# Patient Record
Sex: FEMALE | Race: BLACK OR AFRICAN AMERICAN | Employment: OTHER | URBAN - METROPOLITAN AREA
[De-identification: names, ages, dates, MRNs, and addresses within clinical notes are randomized per-mention and may not be internally consistent; named-entity substitution may affect disease eponyms.]

---

## 2017-01-18 ENCOUNTER — OFFICE VISIT (OUTPATIENT)
Dept: FAMILY MEDICINE CLINIC | Facility: CLINIC | Age: 47
End: 2017-01-18

## 2017-01-18 VITALS
TEMPERATURE: 97.8 F | OXYGEN SATURATION: 100 % | HEART RATE: 59 BPM | HEIGHT: 63 IN | RESPIRATION RATE: 14 BRPM | WEIGHT: 184 LBS | SYSTOLIC BLOOD PRESSURE: 114 MMHG | DIASTOLIC BLOOD PRESSURE: 86 MMHG | BODY MASS INDEX: 32.6 KG/M2

## 2017-01-18 DIAGNOSIS — Z12.4 ROUTINE CERVICAL SMEAR: Primary | ICD-10-CM

## 2017-01-18 DIAGNOSIS — Z12.4 SCREENING FOR MALIGNANT NEOPLASM OF CERVIX: ICD-10-CM

## 2017-01-18 DIAGNOSIS — Z01.419 WELL WOMAN EXAM WITH ROUTINE GYNECOLOGICAL EXAM: Primary | ICD-10-CM

## 2017-01-18 NOTE — MR AVS SNAPSHOT
Visit Information Date & Time Provider Department Dept. Phone Encounter #  
 1/18/2017  1:45 PM Jacob Rivera MD [facility] [phone] [id number] Follow-up Instructions Return in 3 years (on 1/18/2020), or if Pap is normal. Upcoming Health Maintenance Date Due  
 PAP AKA CERVICAL CYTOLOGY 4/27/1989 DTaP/Tdap/Td series (2 - Td) 9/19/2026 Allergies as of 1/18/2017  Review Complete On: 1/18/2017 By: Jacob Rivera MD  
 No Known Allergies Current Immunizations  Reviewed on 9/19/2016 Name Date Influenza Vaccine (Quad) PF 9/19/2016 11:30 AM  
 Tdap 9/19/2016 11:30 AM  
  
 Not reviewed this visit You Were Diagnosed With   
  
 Codes Comments Well woman exam with routine gynecological exam     ICD-10-CM: O83.249 ICD-9-CM: V72.31 [V72.31] Screening for malignant neoplasm of cervix     ICD-10-CM: Z12.4 ICD-9-CM: V76.2 Vitals BP Pulse Temp Resp Height(growth percentile) Weight(growth percentile) 114/86 (BP 1 Location: Right arm, BP Patient Position: Sitting) (!) 59 97.8 °F (36.6 °C) (Oral) 14 5' 3\" (1.6 m) 184 lb (83.5 kg) LMP SpO2 BMI OB Status Smoking Status 11/10/2016 100% 32.59 kg/m2 Having regular periods Never Smoker BMI and BSA Data Body Mass Index Body Surface Area 32.59 kg/m 2 1.93 m 2 Your Updated Medication List  
  
   
This list is accurate as of: 1/18/17  3:08 PM.  Always use your most recent med list.  
  
  
  
  
 aspirin delayed-release 81 mg tablet Take  by mouth daily. naproxen 500 mg tablet Commonly known as:  NAPROSYN Take 1 Tab by mouth two (2) times daily (with meals). Omeprazole delayed release 20 mg tablet Commonly known as:  PRILOSEC D/R Take 1 Tab by mouth daily. Indications: GASTROESOPHAGEAL REFLUX We Performed the Following PAP, LIQUID BASED, IMAGE PROCESSED [id number] CPT(R)] Follow-up Instructions Return in 3 years (on 1/18/2020), or if Pap is normal.  
  
  
Introducing [facility]! Dear Aayush Krause: Thank you for requesting a GigaCrete account. Our records indicate that you already have an active GigaCrete account. You can access your account anytime at [email or web address] Did you know that you can access your hospital and ER discharge instructions at any time in GigaCrete? You can also review all of your test results from your hospital stay or ER visit. Additional Information If you have questions, please visit the Frequently Asked Questions section of the GigaCrete website at [email or web address] Remember, GigaCrete is NOT to be used for urgent needs. For medical emergencies, dial 911. Now available from your iPhone and Android! Please provide this summary of care documentation to your next provider. Your primary care clinician is listed as Shu Dodge. If you have any questions after today's visit, please call [phone].

## 2017-01-18 NOTE — PROGRESS NOTES
Subjective:   48 y.o. female for Well Woman Check. Patient's last menstrual period was 11/10/2016. Social History: not sexually active. Pertinent past medical hstory: no history of HTN, DVT, CAD, DM, liver disease, migraines or smoking. Patient Active Problem List    Diagnosis Date Noted    Gastroesophageal reflux disease without esophagitis 2016    Sacroiliac joint dysfunction of both sides 2016    TIA (transient ischemic attack) 2014     Past Medical History   Diagnosis Date    Cardiac nuclear imaging test 2014     Low risk. No ischemia or prior infarction. EF 63%. No RWMA. Normal EKG on pharm stress test.    Cardiac transesophageal echocardiography (TASHA) 2014     EF 55%. Very sm R-L shunt suggestive of PFO. No LA appendage thrombus.  Cardiovascular LLE venous duplex 2015     Left leg:  No DVT. Past Surgical History   Procedure Laterality Date    Hx  section  ,     Social History   Substance Use Topics    Smoking status: Never Smoker    Smokeless tobacco: Not on file    Alcohol use No        ROS:  Feeling well. No dyspnea or chest pain on exertion. No abdominal pain, change in bowel habits, black or bloody stools. No urinary tract symptoms. GYN ROS: normal menses, no abnormal bleeding, pelvic pain or discharge, no breast pain or new or enlarging lumps on self exam, she complains of a skin lesion on her posterior neck for the past 3 days - noted incidentally. She thinks her period is late due to stress. No neurological complaints. Objective:     Visit Vitals    /86 (BP 1 Location: Right arm, BP Patient Position: Sitting)    Pulse (!) 59    Temp 97.8 °F (36.6 °C) (Oral)    Resp 14    Ht 5' 3\" (1.6 m)    Wt 184 lb (83.5 kg)    LMP 11/10/2016    SpO2 100%    BMI 32.59 kg/m2     The patient appears well, alert, oriented x 3, in no distress. ENT normal.  Neck supple. No adenopathy or thyromegaly. HU.  Lungs are clear, good air entry, no wheezes, rhonchi or rales. S1 and S2 normal, no murmurs, regular rate and rhythm. Abdomen soft without tenderness, guarding, mass or organomegaly. Extremities show no edema, normal peripheral pulses. Neurological is normal, no focal findings. Skin: 2.5 X 3cm hyperpigmented lesion on midline upper back/neck (similar to other similar lesions on other parts of her skin). BREAST EXAM: breasts appear normal, no suspicious masses, no skin or nipple changes or axillary nodes    PELVIC EXAM: normal external genitalia, vulva, vagina, cervix, uterus and adnexa, PAP: Pap smear done today, saw liquid based cytology, exam chaperoned by Darlene Spencer LPN    Assessment/Plan:   well woman  Mammogram - already ordered  pap smear  Reassurance about the skin lesion.

## 2017-01-20 LAB
CYTOLOGIST CVX/VAG CYTO: NORMAL
CYTOLOGY CVX/VAG DOC THIN PREP: NORMAL
DX ICD CODE: NORMAL
Lab: NORMAL
OTHER STN SPEC: NORMAL
PATH REPORT.FINAL DX SPEC: NORMAL
STAT OF ADQ CVX/VAG CYTO-IMP: NORMAL

## 2017-01-25 NOTE — PROGRESS NOTES
Patient made aware of normal pap results. Verified name and . Patient verbalized an understanding of results and did not voice any concerns at this time.

## 2017-04-21 ENCOUNTER — OFFICE VISIT (OUTPATIENT)
Dept: FAMILY MEDICINE CLINIC | Facility: CLINIC | Age: 47
End: 2017-04-21

## 2017-04-21 VITALS
DIASTOLIC BLOOD PRESSURE: 62 MMHG | OXYGEN SATURATION: 97 % | BODY MASS INDEX: 31.04 KG/M2 | WEIGHT: 175.2 LBS | RESPIRATION RATE: 12 BRPM | HEIGHT: 63 IN | TEMPERATURE: 98.6 F | SYSTOLIC BLOOD PRESSURE: 113 MMHG | HEART RATE: 68 BPM

## 2017-04-21 DIAGNOSIS — H65.01 RIGHT ACUTE SEROUS OTITIS MEDIA, RECURRENCE NOT SPECIFIED: Primary | ICD-10-CM

## 2017-04-21 RX ORDER — FLUTICASONE PROPIONATE 50 MCG
2 SPRAY, SUSPENSION (ML) NASAL DAILY
Qty: 1 BOTTLE | Refills: 11 | Status: SHIPPED | OUTPATIENT
Start: 2017-04-21 | End: 2018-03-02

## 2017-04-21 NOTE — PROGRESS NOTES
HISTORY OF PRESENT ILLNESS  Neri Casillas is a 48 y.o. female. HPI Comments: Presents with one week history of right ear pain, without discharge, tinnitus or hearing loss. No trauma. Not associated with chewing or jaw excursion. No nasal congestion or sore throat, no dental pain. Naprosyn and aspirin don't really help. Ear Pain   The history is provided by the patient. This is a new problem. Episode onset: about a week ago. The problem occurs constantly. The problem has not changed since onset. Associated symptoms include headaches. Pertinent negatives include no chest pain and no shortness of breath. Nothing aggravates the symptoms. Treatments tried: naprosyn. The treatment provided mild relief. Headache   Associated symptoms include headaches. Pertinent negatives include no chest pain and no shortness of breath. Past Medical History:   Diagnosis Date    Cardiac nuclear imaging test 2014    Low risk. No ischemia or prior infarction. EF 63%. No RWMA. Normal EKG on pharm stress test.    Cardiac transesophageal echocardiography (TASHA) 2014    EF 55%. Very sm R-L shunt suggestive of PFO. No LA appendage thrombus.  Cardiovascular LLE venous duplex 2015    Left leg:  No DVT. Past Surgical History:   Procedure Laterality Date    HX  SECTION  1453,1153       History   Smoking Status    Never Smoker   Smokeless Tobacco    Never Used     Current Outpatient Prescriptions   Medication Sig    aspirin delayed-release 81 mg tablet Take  by mouth daily.  Omeprazole delayed release (PRILOSEC D/R) 20 mg tablet Take 1 Tab by mouth daily. Indications: GASTROESOPHAGEAL REFLUX    naproxen (NAPROSYN) 500 mg tablet Take 1 Tab by mouth two (2) times daily (with meals). No current facility-administered medications for this visit. Review of Systems   Constitutional: Negative for chills and fever. HENT: Positive for ear pain (right).  Negative for congestion, sore throat and tinnitus. Respiratory: Negative for shortness of breath. Cardiovascular: Negative for chest pain. Gastrointestinal: Negative for nausea and vomiting. Neurological: Positive for headaches. Negative for dizziness. Visit Vitals    /62 (BP 1 Location: Right arm, BP Patient Position: Sitting)    Pulse 68    Temp 98.6 °F (37 °C) (Oral)    Resp 12    Ht 5' 3\" (1.6 m)    Wt 175 lb 3.2 oz (79.5 kg)    LMP 03/23/2017 (Approximate)    SpO2 97%    BMI 31.04 kg/m2       Physical Exam   Constitutional: She is oriented to person, place, and time. She appears well-developed and well-nourished. No distress. HENT:   Right Ear: Tympanic membrane is not erythematous. A middle ear effusion is present. Left Ear: Tympanic membrane, external ear and ear canal normal.   Nose: Mucosal edema present. Mouth/Throat: Oropharynx is clear and moist.   No TMJ tenderness or crepitus   Neck: Neck supple. No thyromegaly present. Cardiovascular: Normal rate and regular rhythm. Exam reveals no gallop and no friction rub. No murmur heard. Pulmonary/Chest: Effort normal and breath sounds normal. No respiratory distress. Lymphadenopathy:     She has no cervical adenopathy. Neurological: She is alert and oriented to person, place, and time. Skin: Skin is warm and dry. Psychiatric: She has a normal mood and affect. Her behavior is normal. Judgment and thought content normal.       ASSESSMENT and PLAN    ICD-10-CM ICD-9-CM    1. Right acute serous otitis media, recurrence not specified H65.01 381.01 fluticasone (FLONASE) 50 mcg/actuation nasal spray     Follow-up Disposition:  Return if symptoms worsen or fail to improve. the following changes in treatment are made: Add Flonase for a few weeks - may use Afrin for 3 days only for quick relief  reviewed medications and side effects in detail  Plan of care reviewed - patient verbalize(s) understanding and agreement.

## 2017-04-21 NOTE — PATIENT INSTRUCTIONS
Middle Ear Fluid: Care Instructions  Your Care Instructions    Fluid often builds up inside the ear during a cold or allergies. Usually the fluid drains away, but sometimes a small tube in the ear, called the eustachian tube, stays blocked for months. Symptoms of fluid buildup may include:  · Popping, ringing, or a feeling of fullness or pressure in the ear. · Trouble hearing. · Balance problems and dizziness. In most cases, you can treat yourself at home. Follow-up care is a key part of your treatment and safety. Be sure to make and go to all appointments, and call your doctor if you are having problems. It's also a good idea to know your test results and keep a list of the medicines you take. How can you care for yourself at home? · In most cases, the fluid clears up within a few months without treatment. You may need more tests if the fluid does not clear up after 3 months. · If your doctor prescribed antibiotics, take them as directed. Do not stop taking them just because you feel better. You need to take the full course of antibiotics. When should you call for help? Watch closely for changes in your health, and be sure to contact your doctor if:  · You have pain or a fever. · You have any new symptoms, such as hearing problems. · You do not get better as expected. Where can you learn more? Go to http://wil-ahsan.info/. Enter W164 in the search box to learn more about \"Middle Ear Fluid: Care Instructions. \"  Current as of: July 29, 2016  Content Version: 11.2  © 4837-9229 [employer]. Care instructions adapted under license by [employer] (which disclaims liability or warranty for this information). If you have questions about a medical condition or this instruction, always ask your healthcare professional. [address] any warranty or liability for your use of this information.

## 2017-04-21 NOTE — PROGRESS NOTES
1. Have you been to the ER, urgent care clinic since your last visit? Hospitalized since your last visit? No    2. Have you seen or consulted any other health care providers outside of the [address] [place] [address] Drive since your last visit? Include any pap smears or colon screening.  No

## 2017-04-21 NOTE — MR AVS SNAPSHOT
Visit Information Date & Time Provider Department Dept. Phone Encounter #  
 4/21/2017 12:15 PM Asetr Go MD [facility] [phone] [id number] Follow-up Instructions Return if symptoms worsen or fail to improve. Upcoming Health Maintenance Date Due  
 PAP AKA CERVICAL CYTOLOGY 1/18/2020 DTaP/Tdap/Td series (2 - Td) 9/19/2026 Allergies as of 4/21/2017  Review Complete On: 4/21/2017 By: Aster Go MD  
 No Known Allergies Current Immunizations  Reviewed on 9/19/2016 Name Date Influenza Vaccine (Quad) PF 9/19/2016 11:30 AM  
 Tdap 9/19/2016 11:30 AM  
  
 Not reviewed this visit You Were Diagnosed With   
  
 Codes Comments Right acute serous otitis media, recurrence not specified    -  Primary ICD-10-CM: H65.01 
ICD-9-CM: 381.01 Vitals BP Pulse Temp Resp Height(growth percentile) Weight(growth percentile) 113/62 (BP 1 Location: Right arm, BP Patient Position: Sitting) 68 98.6 °F (37 °C) (Oral) 12 5' 3\" (1.6 m) 175 lb 3.2 oz (79.5 kg) LMP SpO2 BMI OB Status Smoking Status 03/23/2017 (Approximate) 97% 31.04 kg/m2 Having regular periods Never Smoker BMI and BSA Data Body Mass Index Body Surface Area 31.04 kg/m 2 1.88 m 2 Preferred Pharmacy Pharmacy Name Phone [facility] [address] [phone] Your Updated Medication List  
  
   
This list is accurate as of: 4/21/17 12:33 PM.  Always use your most recent med list.  
  
  
  
  
 aspirin delayed-release 81 mg tablet Take  by mouth daily. fluticasone 50 mcg/actuation nasal spray Commonly known as:  [place] 2 Sprays by Both Nostrils route daily. naproxen 500 mg tablet Commonly known as:  NAPROSYN Take 1 Tab by mouth two (2) times daily (with meals). Omeprazole delayed release 20 mg tablet Commonly known as:  PRILOSEC D/R  
 Take 1 Tab by mouth daily. Indications: GASTROESOPHAGEAL REFLUX Prescriptions Sent to Pharmacy Refills  
 fluticasone (FLONASE) 50 mcg/actuation nasal spray 11 Si Sprays by Both Nostrils route daily. Class: Normal  
 Pharmacy: [facility] [address] Ph #: [phone] Route: Both Nostrils Follow-up Instructions Return if symptoms worsen or fail to improve. Patient Instructions Middle Ear Fluid: Care Instructions Your Care Instructions Fluid often builds up inside the ear during a cold or allergies. Usually the fluid drains away, but sometimes a small tube in the ear, called the eustachian tube, stays blocked for months. Symptoms of fluid buildup may include: · Popping, ringing, or a feeling of fullness or pressure in the ear. · Trouble hearing. · Balance problems and dizziness. In most cases, you can treat yourself at home. Follow-up care is a key part of your treatment and safety. Be sure to make and go to all appointments, and call your doctor if you are having problems. It's also a good idea to know your test results and keep a list of the medicines you take. How can you care for yourself at home? · In most cases, the fluid clears up within a few months without treatment. You may need more tests if the fluid does not clear up after 3 months. · If your doctor prescribed antibiotics, take them as directed. Do not stop taking them just because you feel better. You need to take the full course of antibiotics. When should you call for help? Watch closely for changes in your health, and be sure to contact your doctor if: 
· You have pain or a fever. · You have any new symptoms, such as hearing problems. · You do not get better as expected. Where can you learn more? Go to http://wil-ahsan.info/.  
Enter M827 in the search box to learn more about \"Middle Ear Fluid: Care Instructions. \" Current as of: July 29, 2016 Content Version: 11.2 © 9021-4144 [employer]. Care instructions adapted under license by HealthSource (which disclaims liability or warranty for this information). If you have questions about a medical condition or this instruction, always ask your healthcare professional. [address] any warranty or liability for your use of this information. Introducing [facility]! Dear Zack Hook: Thank you for requesting a OneBuild account. Our records indicate that you already have an active OneBuild account. You can access your account anytime at [email or web address] Did you know that you can access your hospital and ER discharge instructions at any time in OneBuild? You can also review all of your test results from your hospital stay or ER visit. Additional Information If you have questions, please visit the Frequently Asked Questions section of the OneBuild website at [email or web address] Remember, OneBuild is NOT to be used for urgent needs. For medical emergencies, dial 911. Now available from your iPhone and Android! Please provide this summary of care documentation to your next provider. Your primary care clinician is listed as Wiley Behzad. If you have any questions after today's visit, please call [phone].

## 2018-03-02 ENCOUNTER — OFFICE VISIT (OUTPATIENT)
Dept: FAMILY MEDICINE CLINIC | Facility: CLINIC | Age: 48
End: 2018-03-02

## 2018-03-02 VITALS
OXYGEN SATURATION: 100 % | HEART RATE: 66 BPM | TEMPERATURE: 97.7 F | RESPIRATION RATE: 18 BRPM | BODY MASS INDEX: 32.71 KG/M2 | SYSTOLIC BLOOD PRESSURE: 131 MMHG | DIASTOLIC BLOOD PRESSURE: 73 MMHG | WEIGHT: 184.6 LBS | HEIGHT: 63 IN

## 2018-03-02 DIAGNOSIS — E66.09 CLASS 1 OBESITY DUE TO EXCESS CALORIES WITHOUT SERIOUS COMORBIDITY WITH BODY MASS INDEX (BMI) OF 32.0 TO 32.9 IN ADULT: ICD-10-CM

## 2018-03-02 DIAGNOSIS — K21.9 GASTROESOPHAGEAL REFLUX DISEASE WITHOUT ESOPHAGITIS: ICD-10-CM

## 2018-03-02 DIAGNOSIS — R07.9 CHEST PAIN, UNSPECIFIED TYPE: Primary | ICD-10-CM

## 2018-03-02 RX ORDER — PHENOL/SODIUM PHENOLATE
20 AEROSOL, SPRAY (ML) MUCOUS MEMBRANE DAILY
Qty: 30 TAB | Refills: 11 | Status: SHIPPED | OUTPATIENT
Start: 2018-03-02 | End: 2018-06-19

## 2018-03-02 NOTE — ACP (ADVANCE CARE PLANNING)
The patient was advised and counseled regarding advanced directives. The patient was provided with an information packet.

## 2018-03-02 NOTE — PATIENT INSTRUCTIONS
Body Mass Index: Care Instructions  Your Care Instructions    Body mass index (BMI) can help you see if your weight is raising your risk for health problems. It uses a formula to compare how much you weigh with how tall you are. · A BMI lower than 18.5 is considered underweight. · A BMI between 18.5 and 24.9 is considered healthy. · A BMI between 25 and 29.9 is considered overweight. A BMI of 30 or higher is considered obese. If your BMI is in the normal range, it means that you have a lower risk for weight-related health problems. If your BMI is in the overweight or obese range, you may be at increased risk for weight-related health problems, such as high blood pressure, heart disease, stroke, arthritis or joint pain, and diabetes. If your BMI is in the underweight range, you may be at increased risk for health problems such as fatigue, lower protection (immunity) against illness, muscle loss, bone loss, hair loss, and hormone problems. BMI is just one measure of your risk for weight-related health problems. You may be at higher risk for health problems if you are not active, you eat an unhealthy diet, or you drink too much alcohol or use tobacco products. Follow-up care is a key part of your treatment and safety. Be sure to make and go to all appointments, and call your doctor if you are having problems. It's also a good idea to know your test results and keep a list of the medicines you take. How can you care for yourself at home? · Practice healthy eating habits. This includes eating plenty of fruits, vegetables, whole grains, lean protein, and low-fat dairy. · If your doctor recommends it, get more exercise. Walking is a good choice. Bit by bit, increase the amount you walk every day. Try for at least 30 minutes on most days of the week. · Do not smoke. Smoking can increase your risk for health problems. If you need help quitting, talk to your doctor about stop-smoking programs and medicines. These can increase your chances of quitting for good. · Limit alcohol to 2 drinks a day for men and 1 drink a day for women. Too much alcohol can cause health problems. If you have a BMI higher than 25  · Your doctor may do other tests to check your risk for weight-related health problems. This may include measuring the distance around your waist. A waist measurement of more than 40 inches in men or 35 inches in women can increase the risk of weight-related health problems. · Talk with your doctor about steps you can take to stay healthy or improve your health. You may need to make lifestyle changes to lose weight and stay healthy, such as changing your diet and getting regular exercise. If you have a BMI lower than 18.5  · Your doctor may do other tests to check your risk for health problems. · Talk with your doctor about steps you can take to stay healthy or improve your health. You may need to make lifestyle changes to gain or maintain weight and stay healthy, such as getting more healthy foods in your diet and doing exercises to build muscle. Where can you learn more? Go to http://wil-ahsan.info/. Enter S176 in the search box to learn more about \"Body Mass Index: Care Instructions. \"  Current as of: October 13, 2016  Content Version: 11.4  © 8034-7444 Healthwise[employer]orporated. Care instructions adapted under license by SPO (which disclaims liability or warranty for this information). If you have questions about a medical condition or this instruction, always ask your healthcare professional. [address] any warranty or liability for your use of this information.

## 2018-03-02 NOTE — PROGRESS NOTES
HISTORY OF PRESENT ILLNESS  Williams Bishop is a 50 y.o. female. HPI Comments: Presents with several episodes of transient chest pain, radiating to her mid-back and into both sides of her neck, over the past week. Associated with SOB. These occur when upright, and are not related to eating or lying down. They resolve spontaneously. She has a history of GERD, but hasn't been taking anything for this for at least 2 months. No palpitations, nausea or diaphoresis. She has been evaluated in the past for chest pain, but those pains were longer-lasting, and not as severe. Chest Pain    Associated symptoms include back pain and shortness of breath. Pertinent negatives include no diaphoresis, no fever, no nausea, no palpitations and no vomiting. Back Pain    Associated symptoms include chest pain. Pertinent negatives include no fever. Ear Pain   Associated symptoms include chest pain and shortness of breath. Past Medical History:   Diagnosis Date    Cardiac nuclear imaging test 2014    Low risk. No ischemia or prior infarction. EF 63%. No RWMA. Normal EKG on pharm stress test.    Cardiac transesophageal echocardiography (TASHA) 2014    EF 55%. Very sm R-L shunt suggestive of PFO. No LA appendage thrombus.  Cardiovascular LLE venous duplex 2015    Left leg:  No DVT. Past Surgical History:   Procedure Laterality Date    HX  SECTION  1273,1609       History   Smoking Status    Never Smoker   Smokeless Tobacco    Never Used     Current Outpatient Prescriptions   Medication Sig    aspirin delayed-release 81 mg tablet Take  by mouth daily.  Omeprazole delayed release (PRILOSEC D/R) 20 mg tablet Take 1 Tab by mouth daily. Indications: GASTROESOPHAGEAL REFLUX     No current facility-administered medications for this visit. Review of Systems   Constitutional: Negative for chills, diaphoresis and fever. HENT: Positive for sore throat.     Respiratory: Positive for shortness of breath. Cardiovascular: Positive for chest pain. Negative for palpitations. Gastrointestinal: Negative for nausea and vomiting. Musculoskeletal: Positive for back pain. Visit Vitals    /73 (BP 1 Location: Right arm, BP Patient Position: Sitting)    Pulse 66    Temp 97.7 °F (36.5 °C) (Oral)    Resp 18    Ht 5' 3\" (1.6 m)    Wt 184 lb 9.6 oz (83.7 kg)    SpO2 100%    BMI 32.7 kg/m2       Physical Exam   Constitutional: She is oriented to person, place, and time. She appears well-developed and well-nourished. No distress. HENT:   Right Ear: Tympanic membrane, external ear and ear canal normal.   Left Ear: Tympanic membrane, external ear and ear canal normal.   Nose: Nose normal.   Mouth/Throat: Oropharynx is clear and moist.   Neck: Neck supple. No thyromegaly present. Cardiovascular: Normal rate, regular rhythm and intact distal pulses. Exam reveals no gallop and no friction rub. No murmur heard. Pulmonary/Chest: Effort normal and breath sounds normal. No respiratory distress. Musculoskeletal: She exhibits no edema. Lymphadenopathy:     She has no cervical adenopathy. Neurological: She is alert and oriented to person, place, and time. Skin: Skin is warm and dry. Psychiatric: She has a normal mood and affect. Her behavior is normal. Judgment and thought content normal.       ASSESSMENT and PLAN    ICD-10-CM ICD-9-CM    1. Chest pain, unspecified type R07.9 786.50 REFERRAL TO CARDIOLOGY   2. Gastroesophageal reflux disease without esophagitis K21.9 530.81 Omeprazole delayed release (PRILOSEC D/R) 20 mg tablet   3. Class 1 obesity due to excess calories without serious comorbidity with body mass index (BMI) of 32.0 to 32.9 in adult E66.09 278.00     Z68.32 V85.32      Follow-up Disposition:  Return in about 3 months (around 6/2/2018). the following changes in treatment are made: Advised to resume Omeprazole every day.   reviewed diet, exercise and weight control  reviewed medications and side effects in detail  Referral to Cardiology. Discussed the patient's BMI with her. The BMI follow up plan is as follows:     dietary management education, guidance, and counseling  encourage exercise  monitor weight  prescribed dietary intake    An After Visit Summary was printed and given to the patient. Plan of care reviewed - patient verbalize(s) understanding and agreement.

## 2018-03-02 NOTE — PROGRESS NOTES
Identified pt with two pt identifiers(name and ). Reviewed record in preparation for visit and have obtained necessary documentation. Chief Complaint   Patient presents with    Chest Pain     all started last week,denied trying anything OTC    Back Pain    Ear Pain        There are no preventive care reminders to display for this patient.  reviewed w/patient. Declined the Influenza vaccine today. Depression Screening:  PHQ over the last two weeks 2017 2016 10/17/2016   Little interest or pleasure in doing things Not at all - Not at all   Feeling down, depressed or hopeless Not at all Not at all Not at all   Total Score PHQ 2 0 - 0       Learning Assessment:  Learning Assessment 2016   PRIMARY LEARNER Patient   PRIMARY LANGUAGE ENGLISH   LEARNER PREFERENCE PRIMARY DEMONSTRATION   ANSWERED BY patient   RELATIONSHIP SELF       Abuse Screening:  No flowsheet data found. Fall Risk  No flowsheet data found. Coordination of Care Questionnaire:  :   1) Have you been to an emergency room, urgent care clinic since your last visit? no   Hospitalized since your last visit? no             2. Have seen or consulted any other health care provider since your last visit? No  If yes, where when, and reason for visit? 3) Do you have an Advanced Directive/ Living Will in place? No  If no, would you like information YES    Patient is accompanied by self.

## 2018-03-02 NOTE — MR AVS SNAPSHOT
[address] [place] [address] 1 [facility] [address] 
[phone] Patient: Yanci Bradshaw MRN: [id number] :1970 Visit Information Date & Time Provider Department Dept. Phone Encounter #  
 3/2/2018 10:30 AM Annalee Das MD [facility] [phone] [id number] Follow-up Instructions Return in about 3 months (around 2018). Upcoming Health Maintenance Date Due  
 PAP AKA CERVICAL CYTOLOGY 2020 DTaP/Tdap/Td series (2 - Td) 2026 Allergies as of 3/2/2018  Review Complete On: 3/2/2018 By: Annalee Das MD  
 No Known Allergies Current Immunizations  Reviewed on 3/2/2018 Name Date Influenza Vaccine (Quad) PF 2016 11:30 AM  
 Tdap 2016 11:30 AM  
  
 Reviewed by Marium Ledesma LPN on 7282 at 55:25 AM  
You Were Diagnosed With   
  
 Codes Comments Chest pain, unspecified type    -  Primary ICD-10-CM: R07.9 ICD-9-CM: 786.50 Gastroesophageal reflux disease without esophagitis     ICD-10-CM: K21.9 ICD-9-CM: 530.81 Class 1 obesity due to excess calories without serious comorbidity with body mass index (BMI) of 32.0 to 32.9 in adult     ICD-10-CM: E66.09, Z68.32 
ICD-9-CM: 278.00, V85.32 Vitals BP Pulse Temp Resp Height(growth percentile) Weight(growth percentile) 131/73 (BP 1 Location: Right arm, BP Patient Position: Sitting) 66 97.7 °F (36.5 °C) (Oral) 18 5' 3\" (1.6 m) 184 lb 9.6 oz (83.7 kg) SpO2 BMI OB Status Smoking Status 100% 32.7 kg/m2 Having regular periods Never Smoker Vitals History BMI and BSA Data Body Mass Index Body Surface Area 32.7 kg/m 2 1.93 m 2 Preferred Pharmacy Pharmacy Name Phone [address] [place] [address]. [phone] Your Updated Medication List  
  
   
This list is accurate as of 3/2/18 11:28 AM.  Always use your most recent med list.  
  
  
  
  
 aspirin delayed-release 81 mg tablet Take  by mouth daily. Omeprazole delayed release 20 mg tablet Commonly known as:  PRILOSEC D/R Take 1 Tab by mouth daily. Indications: gastroesophageal reflux disease Prescriptions Sent to Pharmacy Refills Omeprazole delayed release (PRILOSEC D/R) 20 mg tablet 11 Sig: Take 1 Tab by mouth daily. Indications: gastroesophageal reflux disease Class: Normal  
 Pharmacy: [facility] [address]IL[address] DUVAL [address]  #: [phone] Route: Oral  
  
We Performed the Following REFERRAL TO CARDIOLOGY [TJT59 Custom] Comments:  
 Please evaluate for recurrent chest pain. Follow-up Instructions Return in about 3 months (around 6/2/2018). Referral Information Referral ID Referred By Referred To  
  
 [id number] Sherryle Scrape, MD   
   [address] [facility] [address] [facility], [address] Phone: [phone] Fax: [phone] Visits Status Start Date End Date 1 New Request 3/2/18 3/2/19 If your referral has a status of pending review or denied, additional information will be sent to support the outcome of this decision. Patient Instructions Body Mass Index: Care Instructions Your Care Instructions Body mass index (BMI) can help you see if your weight is raising your risk for health problems. It uses a formula to compare how much you weigh with how tall you are. · A BMI lower than 18.5 is considered underweight. · A BMI between 18.5 and 24.9 is considered healthy. · A BMI between 25 and 29.9 is considered overweight. A BMI of 30 or higher is considered obese. If your BMI is in the normal range, it means that you have a lower risk for weight-related health problems.  If your BMI is in the overweight or obese range, you may be at increased risk for weight-related health problems, such as high blood pressure, heart disease, stroke, arthritis or joint pain, and diabetes. If your BMI is in the underweight range, you may be at increased risk for health problems such as fatigue, lower protection (immunity) against illness, muscle loss, bone loss, hair loss, and hormone problems. BMI is just one measure of your risk for weight-related health problems. You may be at higher risk for health problems if you are not active, you eat an unhealthy diet, or you drink too much alcohol or use tobacco products. Follow-up care is a key part of your treatment and safety. Be sure to make and go to all appointments, and call your doctor if you are having problems. It's also a good idea to know your test results and keep a list of the medicines you take. How can you care for yourself at home? · Practice healthy eating habits. This includes eating plenty of fruits, vegetables, whole grains, lean protein, and low-fat dairy. · If your doctor recommends it, get more exercise. Walking is a good choice. Bit by bit, increase the amount you walk every day. Try for at least 30 minutes on most days of the week. · Do not smoke. Smoking can increase your risk for health problems. If you need help quitting, talk to your doctor about stop-smoking programs and medicines. These can increase your chances of quitting for good. · Limit alcohol to 2 drinks a day for men and 1 drink a day for women. Too much alcohol can cause health problems. If you have a BMI higher than 25 · Your doctor may do other tests to check your risk for weight-related health problems. This may include measuring the distance around your waist. A waist measurement of more than 40 inches in men or 35 inches in women can increase the risk of weight-related health problems. · Talk with your doctor about steps you can take to stay healthy or improve your health.  You may need to make lifestyle changes to lose weight and stay healthy, such as changing your diet and getting regular exercise. If you have a BMI lower than 18.5 · Your doctor may do other tests to check your risk for health problems. · Talk with your doctor about steps you can take to stay healthy or improve your health. You may need to make lifestyle changes to gain or maintain weight and stay healthy, such as getting more healthy foods in your diet and doing exercises to build muscle. Where can you learn more? Go to http://wil-ahsan.info/. Enter S176 in the search box to learn more about \"Body Mass Index: Care Instructions. \" Current as of: October 13, 2016 Content Version: 11.4 © 3577-3993 [email or web address]. Care instructions adapted under license by [employer] (which disclaims liability or warranty for this information). If you have questions about a medical condition or this instruction, always ask your healthcare professional. [address] any warranty or liability for your use of this information. Introducing [facility]! Dear Joceline Abarca: Thank you for requesting a myBarrister account. Our records indicate that you already have an active myBarrister account. You can access your account anytime at [email or web address] Did you know that you can access your hospital and ER discharge instructions at any time in myBarrister? You can also review all of your test results from your hospital stay or ER visit. Additional Information If you have questions, please visit the Frequently Asked Questions section of the myBarrister website at [email or web address] Remember, myBarrister is NOT to be used for urgent needs. For medical emergencies, dial 911. Now available from your iPhone and Android! Please provide this summary of care documentation to your next provider. Your primary care clinician is listed as Jacob Zheng.  If you have any questions after today's visit, please call [phone].

## 2018-03-20 ENCOUNTER — TELEPHONE (OUTPATIENT)
Dept: CARDIOLOGY CLINIC | Age: 48
End: 2018-03-20

## 2018-06-08 ENCOUNTER — APPOINTMENT (OUTPATIENT)
Dept: CT IMAGING | Age: 48
DRG: 069 | End: 2018-06-08
Attending: EMERGENCY MEDICINE
Payer: SELF-PAY

## 2018-06-08 ENCOUNTER — APPOINTMENT (OUTPATIENT)
Dept: MRI IMAGING | Age: 48
DRG: 069 | End: 2018-06-08
Attending: EMERGENCY MEDICINE
Payer: SELF-PAY

## 2018-06-08 ENCOUNTER — APPOINTMENT (OUTPATIENT)
Dept: GENERAL RADIOLOGY | Age: 48
DRG: 069 | End: 2018-06-08
Attending: EMERGENCY MEDICINE
Payer: SELF-PAY

## 2018-06-08 ENCOUNTER — HOSPITAL ENCOUNTER (INPATIENT)
Age: 48
LOS: 1 days | Discharge: HOME | DRG: 069 | End: 2018-06-09
Attending: EMERGENCY MEDICINE | Admitting: INTERNAL MEDICINE
Payer: SELF-PAY

## 2018-06-08 DIAGNOSIS — I63.9 CEREBROVASCULAR ACCIDENT (CVA), UNSPECIFIED MECHANISM (HCC): Primary | ICD-10-CM

## 2018-06-08 PROBLEM — G43.829 MENSTRUAL MIGRAINE: Status: ACTIVE | Noted: 2018-06-08

## 2018-06-08 LAB
ALBUMIN SERPL-MCNC: 3.3 G/DL (ref 3.4–5)
ALBUMIN/GLOB SERPL: 0.9 {RATIO} (ref 0.8–1.7)
ALP SERPL-CCNC: 82 U/L (ref 45–117)
ALT SERPL-CCNC: 28 U/L (ref 13–56)
ANION GAP SERPL CALC-SCNC: 3 MMOL/L (ref 3–18)
APTT PPP: 29.2 SEC (ref 23–36.4)
AST SERPL-CCNC: 19 U/L (ref 15–37)
BASOPHILS # BLD: 0 K/UL (ref 0–0.06)
BASOPHILS NFR BLD: 0 % (ref 0–2)
BILIRUB SERPL-MCNC: 0.3 MG/DL (ref 0.2–1)
BUN SERPL-MCNC: 11 MG/DL (ref 7–18)
BUN/CREAT SERPL: 18 (ref 12–20)
CALCIUM SERPL-MCNC: 9.8 MG/DL (ref 8.5–10.1)
CHLORIDE SERPL-SCNC: 109 MMOL/L (ref 100–108)
CK MB CFR SERPL CALC: 1 % (ref 0–4)
CK MB SERPL-MCNC: 1.8 NG/ML (ref 5–25)
CK SERPL-CCNC: 178 U/L (ref 26–192)
CO2 SERPL-SCNC: 30 MMOL/L (ref 21–32)
CREAT SERPL-MCNC: 0.6 MG/DL (ref 0.6–1.3)
DIFFERENTIAL METHOD BLD: ABNORMAL
EOSINOPHIL # BLD: 0.1 K/UL (ref 0–0.4)
EOSINOPHIL NFR BLD: 2 % (ref 0–5)
ERYTHROCYTE [DISTWIDTH] IN BLOOD BY AUTOMATED COUNT: 12.3 % (ref 11.6–14.5)
GLOBULIN SER CALC-MCNC: 3.8 G/DL (ref 2–4)
GLUCOSE BLD STRIP.AUTO-MCNC: 117 MG/DL (ref 70–110)
GLUCOSE BLD STRIP.AUTO-MCNC: 98 MG/DL (ref 70–110)
GLUCOSE SERPL-MCNC: 109 MG/DL (ref 74–99)
HCT VFR BLD AUTO: 32.5 % (ref 35–45)
HGB BLD-MCNC: 10.8 G/DL (ref 12–16)
INR PPP: 1.1 (ref 0.8–1.2)
LYMPHOCYTES # BLD: 1.7 K/UL (ref 0.9–3.6)
LYMPHOCYTES NFR BLD: 44 % (ref 21–52)
MCH RBC QN AUTO: 32.1 PG (ref 24–34)
MCHC RBC AUTO-ENTMCNC: 33.2 G/DL (ref 31–37)
MCV RBC AUTO: 96.7 FL (ref 74–97)
MONOCYTES # BLD: 0.3 K/UL (ref 0.05–1.2)
MONOCYTES NFR BLD: 7 % (ref 3–10)
NEUTS SEG # BLD: 1.8 K/UL (ref 1.8–8)
NEUTS SEG NFR BLD: 47 % (ref 40–73)
PLATELET # BLD AUTO: 170 K/UL (ref 135–420)
PMV BLD AUTO: 10.9 FL (ref 9.2–11.8)
POTASSIUM SERPL-SCNC: 3.7 MMOL/L (ref 3.5–5.5)
PROT SERPL-MCNC: 7.1 G/DL (ref 6.4–8.2)
PROTHROMBIN TIME: 13.7 SEC (ref 11.5–15.2)
RBC # BLD AUTO: 3.36 M/UL (ref 4.2–5.3)
SODIUM SERPL-SCNC: 142 MMOL/L (ref 136–145)
TROPONIN I SERPL-MCNC: <0.02 NG/ML (ref 0–0.04)
WBC # BLD AUTO: 3.9 K/UL (ref 4.6–13.2)

## 2018-06-08 PROCEDURE — 99285 EMERGENCY DEPT VISIT HI MDM: CPT

## 2018-06-08 PROCEDURE — 77010033678 HC OXYGEN DAILY

## 2018-06-08 PROCEDURE — 74011250636 HC RX REV CODE- 250/636: Performed by: INTERNAL MEDICINE

## 2018-06-08 PROCEDURE — 74011636320 HC RX REV CODE- 636/320: Performed by: EMERGENCY MEDICINE

## 2018-06-08 PROCEDURE — 93005 ELECTROCARDIOGRAM TRACING: CPT

## 2018-06-08 PROCEDURE — 70544 MR ANGIOGRAPHY HEAD W/O DYE: CPT

## 2018-06-08 PROCEDURE — 70450 CT HEAD/BRAIN W/O DYE: CPT

## 2018-06-08 PROCEDURE — 85610 PROTHROMBIN TIME: CPT | Performed by: EMERGENCY MEDICINE

## 2018-06-08 PROCEDURE — 82962 GLUCOSE BLOOD TEST: CPT

## 2018-06-08 PROCEDURE — 74011250636 HC RX REV CODE- 250/636: Performed by: EMERGENCY MEDICINE

## 2018-06-08 PROCEDURE — 85730 THROMBOPLASTIN TIME PARTIAL: CPT | Performed by: EMERGENCY MEDICINE

## 2018-06-08 PROCEDURE — 65660000000 HC RM CCU STEPDOWN

## 2018-06-08 PROCEDURE — 82550 ASSAY OF CK (CPK): CPT | Performed by: EMERGENCY MEDICINE

## 2018-06-08 PROCEDURE — 80053 COMPREHEN METABOLIC PANEL: CPT | Performed by: EMERGENCY MEDICINE

## 2018-06-08 PROCEDURE — 70498 CT ANGIOGRAPHY NECK: CPT

## 2018-06-08 PROCEDURE — 74011250637 HC RX REV CODE- 250/637: Performed by: EMERGENCY MEDICINE

## 2018-06-08 PROCEDURE — 85025 COMPLETE CBC W/AUTO DIFF WBC: CPT | Performed by: EMERGENCY MEDICINE

## 2018-06-08 PROCEDURE — 70551 MRI BRAIN STEM W/O DYE: CPT

## 2018-06-08 PROCEDURE — 71045 X-RAY EXAM CHEST 1 VIEW: CPT

## 2018-06-08 RX ORDER — ONDANSETRON 2 MG/ML
4 INJECTION INTRAMUSCULAR; INTRAVENOUS
Status: DISCONTINUED | OUTPATIENT
Start: 2018-06-08 | End: 2018-06-09 | Stop reason: HOSPADM

## 2018-06-08 RX ORDER — HEPARIN SODIUM 5000 [USP'U]/ML
5000 INJECTION, SOLUTION INTRAVENOUS; SUBCUTANEOUS EVERY 8 HOURS
Status: DISCONTINUED | OUTPATIENT
Start: 2018-06-08 | End: 2018-06-09 | Stop reason: HOSPADM

## 2018-06-08 RX ORDER — SODIUM CHLORIDE 9 MG/ML
100 INJECTION, SOLUTION INTRAVENOUS ONCE
Status: COMPLETED | OUTPATIENT
Start: 2018-06-08 | End: 2018-06-08

## 2018-06-08 RX ORDER — SODIUM CHLORIDE 0.9 % (FLUSH) 0.9 %
5-10 SYRINGE (ML) INJECTION EVERY 8 HOURS
Status: DISCONTINUED | OUTPATIENT
Start: 2018-06-08 | End: 2018-06-09 | Stop reason: HOSPADM

## 2018-06-08 RX ORDER — ASPIRIN 81 MG/1
81 TABLET ORAL DAILY
Status: DISCONTINUED | OUTPATIENT
Start: 2018-06-09 | End: 2018-06-09 | Stop reason: HOSPADM

## 2018-06-08 RX ORDER — CLOPIDOGREL BISULFATE 75 MG/1
75 TABLET ORAL
Status: COMPLETED | OUTPATIENT
Start: 2018-06-08 | End: 2018-06-08

## 2018-06-08 RX ORDER — SODIUM CHLORIDE 0.9 % (FLUSH) 0.9 %
5-10 SYRINGE (ML) INJECTION AS NEEDED
Status: DISCONTINUED | OUTPATIENT
Start: 2018-06-08 | End: 2018-06-09 | Stop reason: HOSPADM

## 2018-06-08 RX ORDER — ATORVASTATIN CALCIUM 40 MG/1
80 TABLET, FILM COATED ORAL DAILY
Status: DISCONTINUED | OUTPATIENT
Start: 2018-06-08 | End: 2018-06-09 | Stop reason: HOSPADM

## 2018-06-08 RX ORDER — GUAIFENESIN 100 MG/5ML
324 LIQUID (ML) ORAL
Status: COMPLETED | OUTPATIENT
Start: 2018-06-08 | End: 2018-06-08

## 2018-06-08 RX ADMIN — IOPAMIDOL 80 ML: 755 INJECTION, SOLUTION INTRAVENOUS at 13:19

## 2018-06-08 RX ADMIN — Medication 10 ML: at 22:50

## 2018-06-08 RX ADMIN — ATORVASTATIN CALCIUM 80 MG: 40 TABLET, FILM COATED ORAL at 18:22

## 2018-06-08 RX ADMIN — SODIUM CHLORIDE 100 ML/HR: 900 INJECTION, SOLUTION INTRAVENOUS at 18:22

## 2018-06-08 RX ADMIN — HEPARIN SODIUM 5000 UNITS: 5000 INJECTION, SOLUTION INTRAVENOUS; SUBCUTANEOUS at 18:22

## 2018-06-08 RX ADMIN — CLOPIDOGREL 75 MG: 75 TABLET, FILM COATED ORAL at 12:03

## 2018-06-08 RX ADMIN — Medication 10 ML: at 18:22

## 2018-06-08 RX ADMIN — ASPIRIN 81 MG 324 MG: 81 TABLET ORAL at 12:03

## 2018-06-08 NOTE — IP AVS SNAPSHOT
[address] [address] Ethel Adam Patient: Ryland Daigle MRN: [id number] :1970 A check bree indicates which time of day the medication should be taken. My Medications START taking these medications Instructions Each Dose to Equal  
 Morning Noon Evening Bedtime  
 atorvastatin 80 mg tablet Commonly known as:  LIPITOR Start taking on:  6/10/2018 Your last dose was: Your next dose is: Take 1 Tab by mouth daily. 80 mg CONTINUE taking these medications Instructions Each Dose to Equal  
 Morning Noon Evening Bedtime  
 aspirin delayed-release 81 mg tablet Your last dose was: Your next dose is: Take  by mouth daily. Omeprazole delayed release 20 mg tablet Commonly known as:  PRILOSEC D/R Your last dose was: Your next dose is: Take 1 Tab by mouth daily. Indications: gastroesophageal reflux disease 20 mg Where to Get Your Medications Information on where to get these meds will be given to you by the nurse or doctor. ! Ask your nurse or doctor about these medications  
  atorvastatin 80 mg tablet

## 2018-06-08 NOTE — PROGRESS NOTES
Bedside and Verbal shift change report given to [address] [facility] [address] (oncoming nurse) by Rosalee Stewart (offgoing nurse). Report included the following information SBAR and Recent Results.

## 2018-06-08 NOTE — IP AVS SNAPSHOT
[address] [facility] 
 
 
 [address] [address] Zoilae Kia Patient: Darius Stanley MRN: [id number] :1970 About your hospitalization You were admitted on:  2018 You last received care in the:  [facility] [address] You were discharged on:  2018 Why you were hospitalized Your primary diagnosis was:  Cva (Cerebral Vascular Accident) (Hcc) Your diagnoses also included:  Menstrual Migraine, Stroke (Cerebrum) (Hcc) Follow-up Information Follow up With Details Comments Contact Info Roberto Douglass MD In 1 week Office closed. [place] will make appointment on Monday. 14 [facility] [address] [place] [address] 
[phone] Your Scheduled Appointments  12:40 PM EDT Follow Up with Parris Magdaleno MD  
[facility] ([facility]) [facility] [id number] [address] [address] [phone] Discharge Orders None A check bree indicates which time of day the medication should be taken. My Medications START taking these medications Instructions Each Dose to Equal  
 Morning Noon Evening Bedtime  
 atorvastatin 80 mg tablet Commonly known as:  LIPITOR Start taking on:  6/10/2018 Your last dose was: Your next dose is: Take 1 Tab by mouth daily. 80 mg CONTINUE taking these medications Instructions Each Dose to Equal  
 Morning Noon Evening Bedtime  
 aspirin delayed-release 81 mg tablet Your last dose was: Your next dose is: Take  by mouth daily. Omeprazole delayed release 20 mg tablet Commonly known as:  PRILOSEC D/R Your last dose was: Your next dose is: Take 1 Tab by mouth daily. Indications: gastroesophageal reflux disease  20 mg  
    
   
 Where to Get Your Medications Information on where to get these meds will be given to you by the nurse or doctor. ! Ask your nurse or doctor about these medications  
  atorvastatin 80 mg tablet Discharge Instructions Patient armband removed and shredded Quantum Healthhart Activation Thank you for requesting access to Mzinga. Please follow the instructions below to securely access and download your online medical record. Mzinga allows you to send messages to your doctor, view your test results, renew your prescriptions, schedule appointments, and more. How Do I Sign Up? 1. In your internet browser, go to [email or web address] 
2. Click on the First Time User? Click Here link in the Sign In box. You will be redirect to the New Member Sign Up page. 3. Enter your Mzinga Access Code exactly as it appears below. You will not need to use this code after youve completed the sign-up process. If you do not sign up before the expiration date, you must request a new code. Mzinga Access Code: Activation code not generated Current Mzinga Status: Active (This is the date your Mzinga access code will ) 4. Enter the last four digits of your Social Security Number (xxxx) and Date of Birth (mm/dd/yyyy) as indicated and click Submit. You will be taken to the next sign-up page. 5. Create a Mzinga ID. This will be your Mzinga login ID and cannot be changed, so think of one that is secure and easy to remember. 6. Create a Mzinga password. You can change your password at any time. 7. Enter your Password Reset Question and Answer. This can be used at a later time if you forget your password. 8. Enter your e-mail address. You will receive e-mail notification when new information is available in [address] 9. Click Sign Up. You can now view and download portions of your medical record.  
10. Click the Download Summary menu link to download a portable copy of your medical information. Additional Information If you have questions, please visit the Frequently Asked Questions section of the QUIQ website at [email or web address] Remember, MyChart is NOT to be used for urgent needs. For medical emergencies, dial 911. DISCHARGE SUMMARY from Nurse PATIENT INSTRUCTIONS: 
 
What to do at Home: 
Recommended activity: Activity as tolerated, If you experience any of the following symptoms chest pains, shortness of breath, fever, chills, elevated temperature greater than 100.9 F, numbness or tingling in arms or legs, please follow up with nearest Emergency room. *  Please give a list of your current medications to your Primary Care Provider. *  Please update this list whenever your medications are discontinued, doses are 
    changed, or new medications (including over-the-counter products) are added. *  Please carry medication information at all times in case of emergency situations. These are general instructions for a healthy lifestyle: No smoking/ No tobacco products/ Avoid exposure to second hand smoke Surgeon General's Warning:  Quitting smoking now greatly reduces serious risk to your health. Obesity, smoking, and sedentary lifestyle greatly increases your risk for illness A healthy diet, regular physical exercise & weight monitoring are important for maintaining a healthy lifestyle You may be retaining fluid if you have a history of heart failure or if you experience any of the following symptoms:  Weight gain of 3 pounds or more overnight or 5 pounds in a week, increased swelling in our hands or feet or shortness of breath while lying flat in bed. Please call your doctor as soon as you notice any of these symptoms; do not wait until your next office visit. Recognize signs and symptoms of STROKE: 
 
F-face looks uneven A-arms unable to move or move unevenly S-speech slurred or non-existent T-time-call 911 as soon as signs and symptoms begin-DO NOT go Back to bed or wait to see if you get better-TIME IS BRAIN. Warning Signs of HEART ATTACK Call 911 if you have these symptoms: 
? Chest discomfort. Most heart attacks involve discomfort in the center of the chest that lasts more than a few minutes, or that goes away and comes back. It can feel like uncomfortable pressure, squeezing, fullness, or pain. ? Discomfort in other areas of the upper body. Symptoms can include pain or discomfort in one or both arms, the back, neck, jaw, or stomach. ? Shortness of breath with or without chest discomfort. ? Other signs may include breaking out in a cold sweat, nausea, or lightheadedness. Don't wait more than five minutes to call [address]! Fast action can save your life. Calling 911 is almost always the fastest way to get lifesaving treatment. Emergency Medical Services staff can begin treatment when they arrive  up to an hour sooner than if someone gets to the hospital by car. The discharge information has been reviewed with the patient. The patient verbalized understanding. Discharge medications reviewed with the patient and appropriate educational materials and side effects teaching were provided. ___________________________________________________________________________________________________________________________________ AudioPixelshart Announcement We are excited to announce that we are making your provider's discharge notes available to you in AudioPixelshart. You will see these notes when they are completed and signed by the physician that discharged you from your recent hospital stay. If you have any questions or concerns about any information you see in AudioPixelshart, please call the Health Information Department where you were seen or reach out to your Primary Care Provider for more information about your plan of care. Introducing [facility]! Dear Pancho James: Thank you for requesting a TheFriendMail account. Our records indicate that you already have an active TheFriendMail account. You can access your account anytime at [email or web address] Did you know that you can access your hospital and ER discharge instructions at any time in TheFriendMail? You can also review all of your test results from your hospital stay or ER visit. Additional Information If you have questions, please visit the Frequently Asked Questions section of the TheFriendMail website at [email or web address] Remember, TheFriendMail is NOT to be used for urgent needs. For medical emergencies, dial 911. Now available from your iPhone and Android! Introducing Deshaun Garcia As a [facility] It's what's in store patient, I wanted to make you aware of our electronic visit tool called Deshaun Garcia. Selatra/GL 2ours allows you to connect within minutes with a medical provider 24 hours a day, seven days a week via a mobile device or tablet or logging into a secure website from your computer. You can access Deshaun Garcia from anywhere in the [place]. A virtual visit might be right for you when you have a simple condition and feel like you just dont want to get out of bed, or cant get away from work for an appointment, when your regular [facility] provider is not available (evenings, weekends or holidays), or when youre out of town and need minor care. Electronic visits cost only $49 and if the Selatra/GL 2ours provider determines a prescription is needed to treat your condition, one can be electronically transmitted to a nearby pharmacy*. Please take a moment to enroll today if you have not already done so. The enrollment process is free and takes just a few minutes. To enroll, please download the Selatra/GL 2ours tari to your tablet or phone, or visit [email or web address] to enroll on your computer. And, as an [address] [place] [address] patient with a [employer] account, the results of your visits will be scanned into your electronic medical record and your primary care provider will be able to view the scanned results. We urge you to continue to see your regular Coral Slimmer provider for your ongoing medical care. And while your primary care provider may not be the one available when you seek a Deshaun Philipkrishnafin virtual visit, the peace of mind you get from getting a real diagnosis real time can be priceless. For more information on Deshaun Philipkrishnafin, view our Frequently Asked Questions (FAQs) at [email or web address]. Sincerely, 
 
Dipika Del Rosario MD 
Chief Medical Officer [employer] *:  certain medications cannot be prescribed via Deshaun Philipsushant Providers Seen During Your Hospitalization Provider Specialty Primary office phone Karma Bowers MD Emergency Medicine [phone] Fernando Jorgensen MD Internal Medicine [phone] Kenneth Bradford MD Internal Medicine [phone] Your Primary Care Physician (PCP) Primary Care Physician Office Phone Office Fax [address] [facility], [address] [place] [address] [phone] You are allergic to the following No active allergies Recent Documentation Height Weight BMI OB Status Smoking Status 1.6 m 85 kg 33.21 kg/m2 Having regular periods Never Smoker Emergency Contacts Name Discharge Info Relation Home Work Mobile Adelso Wheat DISCHARGE CAREGIVER [3] Other Relative [6] [phone] CAREGIVER [3] Other Relative [6] [phone] Patient Belongings The following personal items are in your possession at time of discharge: 
  Dental Appliances: None         Home Medications: None   Jewelry: Bracelet, Watch  Clothing: At bedside    Other Valuables: Cell Phone Discharge Instructions Attachments/References ATORVASTATIN (BY MOUTH) (ENGLISH) Patient Handouts Atorvastatin (By mouth) Atorvastatin (a-tor-va-STAT-in) Treats high cholesterol and triglyceride levels. Reduces the risk of angina, stroke, heart attack, or certain heart and blood vessel problems. This medicine is a statin. Brand Name(s): Lipitor There may be other brand names for this medicine. When This Medicine Should Not Be Used: This medicine is not right for everyone. Do not use it if you had an allergic reaction to atorvastatin, if you have active liver disease, or if you are pregnant or breastfeeding. How to Use This Medicine:  
Tablet · Take your medicine as directed. Your dose may need to be changed several times to find what works best for you. · Take this medicine at the same time each day. · Swallow the tablet whole. Do not break it. · Missed dose: Take a dose as soon as you remember. If it is less than 12 hours until your next dose, skip the missed dose and take the next dose at the regular time. Do not take 2 doses of this medicine within 12 hours. · Read and follow the patient instructions that come with this medicine. Talk to your doctor or pharmacist if you have any questions. · Store the medicine in a closed container at room temperature, away from heat, moisture, and direct light. Drugs and Foods to Avoid: Ask your doctor or pharmacist before using any other medicine, including over-the-counter medicines, vitamins, and herbal products. · Some medicines can affect how atorvastatin works. Tell your doctor if you also use birth control pills, boceprevir, cimetidine, colchicine, cyclosporine, digoxin, niacin, rifampin, spironolactone, telaprevir, medicine to treat an infection, or medicine to treat HIV/AIDS. Warnings While Using This Medicine: · It is not safe to take this medicine during pregnancy. It could harm an unborn baby. Tell your doctor right away if you become pregnant. · Tell your doctor if you have kidney disease, diabetes, muscle pain or weakness, thyroid problems, have recently had a stroke or TIA (transient ischemic attack), or have a history of liver disease. Tell your doctor if you drink grapefruit juice or drink alcohol regularly. · This medicine can cause muscle problems, which can lead to kidney problems. · Tell any doctor or dentist who treats you that you use this medicine. You may need to stop using it if you have surgery, have an injury, or develop serious health problems. · Your doctor will do lab tests at regular visits to check on the effects of this medicine. Keep all appointments. · Keep all medicine out of the reach of children. Never share your medicine with anyone. Possible Side Effects While Using This Medicine:  
Call your doctor right away if you notice any of these side effects: · Allergic reaction: Itching or hives, swelling in your face or hands, swelling or tingling in your mouth or throat, chest tightness, trouble breathing · Blistering, peeling, red skin rash · Change in how much or how often you urinate · Dark urine or pale stools, nausea, vomiting, loss of appetite, stomach pain, yellow skin or eyes · Fever · Muscle pain, tenderness, or weakness · Unusual tiredness If you notice these less serious side effects, talk with your doctor: · Diarrhea · Joint pain If you notice other side effects that you think are caused by this medicine, tell your doctor. Call your doctor for medical advice about side effects. You may report side effects to FDA at 4-367-FDA-4139 © 2017 [address] Information is for End User's use only and may not be sold, redistributed or otherwise used for commercial purposes. The above information is an  only. It is not intended as medical advice for individual conditions or treatments.  Talk to your doctor, nurse or pharmacist before following any medical regimen to see if it is safe and effective for you. Please provide this summary of care documentation to your next provider. Signatures-by signing, you are acknowledging that this After Visit Summary has been reviewed with you and you have received a copy. Patient Signature:  ____________________________________________________________ Date:  ____________________________________________________________  
  
Lavanda Ferries Provider Signature:  ____________________________________________________________ Date:  ____________________________________________________________

## 2018-06-08 NOTE — PROGRESS NOTES
Problem: Falls - Risk of  Goal: *Absence of Falls  Document Itz Fall Risk and appropriate interventions in the flowsheet.   Outcome: Progressing Towards Goal  Fall Risk Interventions:

## 2018-06-08 NOTE — H&P
History & Physical    Patient: Aminta Schwartz MRN: [id number]  CSN: [id number]    YOB: 1970  Age: 46 y.o. Sex: female      DOA: 2018    Chief Complaint:   Chief Complaint   Patient presents with    Extremity Weakness          HPI:      Aminta Schwartz is a 46 y.o.  female who has  Hx of migrane  And small PFO   Presents to ER with left mouth twitching and left arm and left leg tingling symptoms lasted under 2 hrs had symptoms early today and yesterday symptoms associated with HA normally gets menstrual migranes but has not had cycle in months  Denies chest pain or shortness of breath   In ER code S called CT and CTA essentially negative except for hypoplastic congenital basilar vein     Past Medical History:   Diagnosis Date    Cardiac nuclear imaging test 2014    Low risk. No ischemia or prior infarction. EF 63%. No RWMA. Normal EKG on pharm stress test.    Cardiac transesophageal echocardiography (TASHA) 2014    EF 55%. Very sm R-L shunt suggestive of PFO. No LA appendage thrombus.  Cardiovascular LLE venous duplex 2015    Left leg:  No DVT. Past Surgical History:   Procedure Laterality Date    HX  SECTION  4450,4407       History reviewed. No pertinent family history. Social History     Social History    Marital status:      Spouse name: N/A    Number of children: N/A    Years of education: N/A     Social History Main Topics    Smoking status: Never Smoker    Smokeless tobacco: Never Used    Alcohol use No    Drug use: No    Sexual activity: Not Currently     Other Topics Concern    None     Social History Narrative    ** Merged History Encounter **            Prior to Admission medications    Medication Sig Start Date End Date Taking? Authorizing Provider   Omeprazole delayed release (PRILOSEC D/R) 20 mg tablet Take 1 Tab by mouth daily.  Indications: gastroesophageal reflux disease 3/2/18   Eliecer Varma MD aspirin delayed-release 81 mg tablet Take  by mouth daily. Historical Provider       No Known Allergies      Review of Systems  GENERAL: Patient alert, awake and oriented times 3, able to communicate full sentences and not in distress. HEENT: No change in vision, no earache, tinnitus, sore throat or sinus congestion. NECK: No pain or stiffness. PULMONARY: No shortness of breath, cough or wheeze. Cardiovascular: no pnd or orthopnea, no CP  GASTROINTESTINAL: No abdominal pain, nausea, vomiting or diarrhea, melena or bright red blood per rectum. GENITOURINARY: No urinary frequency, urgency, hesitancy or dysuria. MUSCULOSKELETAL: No joint or muscle pain, no back pain, no recent trauma. DERMATOLOGIC: No rash, no itching, no lesions. ENDOCRINE: No polyuria, polydipsia, no heat or cold intolerance. No recent change in weight. HEMATOLOGICAL: No anemia or easy bruising or bleeding. NEUROLOGIC: + headache, seizures, numbness,+ tingling+ weakness. Left leg arm lip twitching     Physical Exam:     Physical Exam:  Visit Vitals    /57    Pulse 64    Temp 98.1 °F (36.7 °C)    Resp 15    Ht 5' 3\" (1.6 m)    Wt 72.6 kg (160 lb)    SpO2 100%    BMI 28.34 kg/m2      O2 Device: Room air    Temp (24hrs), Av.1 °F (36.7 °C), Min:98.1 °F (36.7 °C), Max:98.1 °F (36.7 °C)             General:  Alert, cooperative, no distress, appears stated age. Head: Normocephalic, without obvious abnormality, atraumatic. Eyes:  Conjunctivae/corneas clear. PERRL, EOMs intact. Nose: Nares normal. No drainage or sinus tenderness. Neck: Supple, symmetrical, trachea midline, no adenopathy, thyroid: no enlargement, no carotid bruit and no JVD. Lungs:   Clear to auscultation bilaterally. Heart:  Regular rate and rhythm, S1, S2 normal.     Abdomen: Soft, non-tender. Bowel sounds normal.    Extremities: Extremities normal, atraumatic, no cyanosis or edema. Pulses: 2+ and symmetric all extremities. Skin:  No rashes or lesions   Neurologic: AAOx3, No focal motor or sensory deficit. Pronator normal  Finger to nose normal   Rapid alternating normal   Upper and lower extremity strength intact        Labs Reviewed:  Recent Results (from the past 24 hour(s))   GLUCOSE, POC    Collection Time: 06/08/18 11:32 AM   Result Value Ref Range    Glucose (POC) 117 (H) 70 - 110 mg/dL   CBC WITH AUTOMATED DIFF    Collection Time: 06/08/18 12:32 PM   Result Value Ref Range    WBC 3.9 (L) 4.6 - 13.2 K/uL    RBC 3.36 (L) 4.20 - 5.30 M/uL    HGB 10.8 (L) 12.0 - 16.0 g/dL    HCT 32.5 (L) 35.0 - 45.0 %    MCV 96.7 74.0 - 97.0 FL    MCH 32.1 24.0 - 34.0 PG    MCHC 33.2 31.0 - 37.0 g/dL    RDW 12.3 11.6 - 14.5 %    PLATELET 946 692 - 425 K/uL    MPV 10.9 9.2 - 11.8 FL    NEUTROPHILS 47 40 - 73 %    LYMPHOCYTES 44 21 - 52 %    MONOCYTES 7 3 - 10 %    EOSINOPHILS 2 0 - 5 %    BASOPHILS 0 0 - 2 %    ABS. NEUTROPHILS 1.8 1.8 - 8.0 K/UL    ABS. LYMPHOCYTES 1.7 0.9 - 3.6 K/UL    ABS. MONOCYTES 0.3 0.05 - 1.2 K/UL    ABS. EOSINOPHILS 0.1 0.0 - 0.4 K/UL    ABS. BASOPHILS 0.0 0.0 - 0.06 K/UL    DF AUTOMATED     METABOLIC PANEL, COMPREHENSIVE    Collection Time: 06/08/18 12:32 PM   Result Value Ref Range    Sodium 142 136 - 145 mmol/L    Potassium 3.7 3.5 - 5.5 mmol/L    Chloride 109 (H) 100 - 108 mmol/L    CO2 30 21 - 32 mmol/L    Anion gap 3 3.0 - 18 mmol/L    Glucose 109 (H) 74 - 99 mg/dL    BUN 11 7.0 - 18 MG/DL    Creatinine 0.60 0.6 - 1.3 MG/DL    BUN/Creatinine ratio 18 12 - 20      GFR est AA >60 >60 ml/min/1.73m2    GFR est non-AA >60 >60 ml/min/1.73m2    Calcium 9.8 8.5 - 10.1 MG/DL    Bilirubin, total 0.3 0.2 - 1.0 MG/DL    ALT (SGPT) 28 13 - 56 U/L    AST (SGOT) 19 15 - 37 U/L    Alk.  phosphatase 82 45 - 117 U/L    Protein, total 7.1 6.4 - 8.2 g/dL    Albumin 3.3 (L) 3.4 - 5.0 g/dL    Globulin 3.8 2.0 - 4.0 g/dL    A-G Ratio 0.9 0.8 - 1.7     PROTHROMBIN TIME + INR    Collection Time: 06/08/18 12:32 PM   Result Value Ref Range Prothrombin time 13.7 11.5 - 15.2 sec    INR 1.1 0.8 - 1.2     PTT    Collection Time: 06/08/18 12:32 PM   Result Value Ref Range    aPTT 29.2 23.0 - 36.4 SEC   CARDIAC PANEL,(CK, CKMB & TROPONIN)    Collection Time: 06/08/18 12:32 PM   Result Value Ref Range     26 - 192 U/L    CK - MB 1.8 <3.6 ng/ml    CK-MB Index 1.0 0.0 - 4.0 %    Troponin-I, Qt. <0.02 0.0 - 0.045 NG/ML   EKG, 12 LEAD, INITIAL    Collection Time: 06/08/18 12:51 PM   Result Value Ref Range    Ventricular Rate 54 BPM    Atrial Rate 54 BPM    P-R Interval 146 ms    QRS Duration 74 ms    Q-T Interval 382 ms    QTC Calculation (Bezet) 362 ms    Calculated P Axis 68 degrees    Calculated R Axis 58 degrees    Calculated T Axis 11 degrees    Diagnosis       Sinus bradycardia  Nonspecific T wave abnormality  Abnormal ECG  When compared with ECG of 24-OCT-2016 11:15,  No significant change was found       All lab results for the last 24 hours reviewed. and EKG    Procedures/imaging: see electronic medical records for all procedures/Xrays and details which were not copied into this note but were reviewed prior to creation of Plan    CT Results  (Last 48 hours)               06/08/18 1317  CTA HEAD NECK W WO CONT Final result    Narrative:  CTA OF  ARTERIES OF NECK AND HEAD:   --------------------------------------------------------------           HISTORY: [Acute neurological deficit. CODE S. Follow-up CTA of neck and head after negative CT scan of head. TECHNIQUE:       Following intravenous administration of  [18 ml of] [Isovue-370 contrast,   dynamic[ 1.25] mm axial sections from the level of aortic arch up to level of   vertex been obtained. Axial images are reformatted with slice thickness of 7.453 mm. 3-D MIP coronal, axial and sagittal scans bed images reformatted. Multilevel multiplanar CPR images reformatted. 3-D images reformatted and displayed with rotating frames.        All CT scans at this facility are performed using dose optimization technique as   appropriate to a  performed  examination, to include automated exposer control,   adjustment mA and / or  KV according to patient size (including appropriate   matching  for site specific examination), or use  of iterative  reconstruction   technique. .               COMPARISON STUDY:[ None.]       --------------------------------------------------------------       FINDINGS:       At the medial portion of apex of right lung there is a pulmonary nodule   measuring 5.2 mm in diameter, as on image number 19/3 no other diagnostic   finding in visualized upper portions of upper lobes of both lungs. ---------------------------------------------------------------       CTA of NECK                [No evidence] of dissection involving carotid arterial systems or vertebral   arteries of both sides. [No evidence] of extravasation of contrast into soft tissues of neck. Right common carotid artery: [No evidence] of stenosis. [ Teddy Herd calcified plaque       Right common carotid bifurcation:[ No ]calcified plaques. [ Teddy Herd stenosis. Right internal carotid artery:[ No evidence of stenosis. ]. [ Teddy Loyalize calcified   plaques. [FJDLKR tortuous]. Right external carotid artery: Normally patent. Right vertebral artery: [normally patent.] Right vertebral artery is of small   caliber. The lower end of right vertebral artery is partially obscured by the   dense contrast in venous structures. No obvious stenosis.       ----------------------------------------------       Left common carotid artery:[ No stenosis]. [ No significant plaque]. Left common carotid bifurcation: [No] stenosis. [ No significant ]plaque. Left internal carotid artery:[ No] stenosis. [ Teddy Herd calcified plaques. [RFFRWU   Tortuous]. Left external carotid artery:[ Normally patent]. Left vertebral artery:[ Normally patent]. Any abnormalities in soft tissues of neck :[None]. Cervical spine: Mild reversal of cervical lordosis, which may be due to muscle   spasm or positional. No definable fracture in cervical spine. Evidence of   mild-to-moderate degenerative disc disease at C5-C6 level.       -------------------------------------------------------------------       CTA of cerebral arteries:       ------------------------------------------------------------------       The petrous, cavernous, paraclinoid and supraclinoid segments of right internal   carotid artery:[ Patent. No Calcified plaques in cavernous segments. No obvious   stenosis]. Right posterior communicating artery is patent and of moderate size. Right anterior cerebral artery and its major  branches:[ Normally patent]. Right middle cerebral artery and its major  branches : [normally patent]. The petrous, cavernous and paraclinoid as well as supraclinoid segments of left   internal carotid artery: Normally patent without obvious calcified plaque and   without obvious stenosis. Left posterior communicating artery is patent and of moderate size. Left anterior cerebral artery at its major  branches : [ normally patent]. Left middle cerebral artery and its major  branches :[ normally patent]. ---------------------------------------------------       Posterior fossa circulations:       Intracranial portions of vertebral arteries of both sides:[ Normally patent]. Right vertebral artery is of small caliber. Basilar artery: [Tortuous]. [ Patent without stenosis. ]. [ ]       Posterior inferior cerebellar arteries of both sides :[ normally patent]. Anterior inferior cerebellar arteries of both sides:[No clearly visualized. 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  There is prominent patent right posterior   communicating artery providing collateral circulation from right ICA to right   PCA. Rest of right PCN major branches are normally patent. The left posterior cerebral artery and its major branches:[ The P1 segment of   left PCA is congenitally absent. There is prominent patent left posterior   comminuting artery, providing collateral circulation from left ICA to left PCA. Rest of left PCA and their major branches are normally patent. All major dural sinuses:[ Opacified without dural  sinus thrombosis.]       Any vascular abnormality :[ none evident]       Any cerebral aneurysm:[ None demonstrated.]       Any vascular malformation:[ None demonstrated.]               Any demonstrable parenchymal abnormality in brain:[ None demonstrated.]       Subdural or epidural hematomas:[ None demonstrated.]       Any demonstrable subarachnoid hemorrhages:[ None evident. But subtle SAH may not   be seen on CTA study.]       Paranasal sinuses:[ Negative.]       Orbits: [ negative]       Skull and base of skull: Negative               IMPRESSIONS:       The common carotid and internal carotid arteries of both sides are normally   patent. The vertebral arteries of both sides are normally patent in neck. No evidence of any compromise in the intracranial anterior circulation of   bilateral internal carotid arteries. 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 But, there are   patent and bilateral prominent posterior comminuting arteries, providing fetal   collateral circulations from bilateral ICAs to bilateral PCAs. Otherwise there is no compromise in the vertebral basilar circulation. 06/08/18 1150  CT HEAD WO CONT Final result    Impression:  IMPRESSION:       No evidence of intracranial hemorrhages or any other definable acute   intracranial abnormality.        No definable focal abnormality in brain. No evidence of intracranial mass lesion or mass effect. Findings have been reported by me to ER physician Dr. Zeny Loving, at 1202 hours on   5/8/2018. Narrative:    CT scan of head, without contrast:               Indication:       Left-sided numbness in this morning. CODE S.       TECHNIQUE:       Contiguous 5 mm thick axial sections of brain have been obtained without   intravenous contrast.           [2-D coronal and sagittal images reformatted.]        The study has been performed utilizing appropriate radiation dose reduction   technique according to specification of the scanner, with modification of MAA/KV   and appropriate collimation adjusted to patient's body habitus. COMPARISON STUDY: [CT scan of head on 01/06/2016.   -------------------------------------           FINDINGS:               Intracranial hemorrhage :[None.]       Intracranial mass lesion:[ None.]       Midline shift: [None.]       Cerebral cortical atrophy:[ None]. Cerebral central atrophy:[ None.]       Chronic microvascular ischemic changes/aging changes in white matter:[ None.]       Acute cortical infarction:[ None.]       Old cerebral infarction: [None.]       Basal ganglia structures of both sides:[ No diagnostic finding.]       Bilateral thalami: No demonstrable abnormality. Brainstem:[ No diagnostic  finding]       Cerebellum: No diagnostic finding. Calvarium and base of skull:[ No fracture or focal lesion].        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     Norma Loco MD  6/8/2018 4:56 PM

## 2018-06-08 NOTE — ED NOTES
Patient arrived with c/o of left sided weakness and tingling that started this morning at 5am. Patient has  Change in sensation  On the left side along with heaviness. No hx of CVA in  Past. AO X4.

## 2018-06-08 NOTE — ED NOTES
TRANSFER - OUT REPORT:    Verbal report given to MITUL Mcfadden RN (name) on Mago Mcallister  being transferred to [address] [place] [address] (unit) for routine progression of care       Report consisted of patients Situation, Background, Assessment and   Recommendations(SBAR). Information from the following report(s) SBAR, ED Summary, [facility] and Recent Results was reviewed with the receiving nurse. Lines:   Peripheral IV 06/08/18 Right Antecubital (Active)   Site Assessment Clean, dry, & intact 6/8/2018 12:35 PM   Phlebitis Assessment 0 6/8/2018 12:35 PM   Infiltration Assessment 0 6/8/2018 12:35 PM   Dressing Status Clean, dry, & intact 6/8/2018 12:35 PM   Dressing Type Tape;Transparent 6/8/2018 12:35 PM   Hub Color/Line Status Green;Flushed;Patent 6/8/2018 12:35 PM   Action Taken Blood drawn 6/8/2018 12:35 PM        Opportunity for questions and clarification was provided.       Patient transported with:   Transport

## 2018-06-08 NOTE — CONSULTS
NEUROLOGY CONSULT NOTE    Patient ID:  Heather Recio  [id number]  46 y.o.  1970    Date of Consultation:  June 8, 2018    Referring Physician: Kimberley Mix MD    Reason for Consultation:  Possible acute CVA        Subjective:       History of Present Illness:     Heather Recio is a 46 y.o. female with a h/o CAD who presents to the ED complaining of intermittent left facial numbness that began at 0500 when the patient woke up with associated intermittent left arm > leg numbness/ weakness/heaviness. The patient's last normal was 0200 this morning when the patient went to sleep. She notes that when she woke up at 5:00 her left side of lip was shacking and was slightly droop and numb but it has since resolved. She reports experiencing similar sx on her right side 2 weeks ago but states that she did not seek out medical help at the time. The patient denies CP. No other complaints or concerns at this time. Now her symptoms improved, but still not back to her baseline states. She has hx of  occasional migraines.       Denies hx of seizures, no eye pain or loss of visions, no hx of  prolong focal neurological deficits with return to baseline. Patient Active Problem List    Diagnosis Date Noted    CVA (cerebral vascular accident) ([facility] [address] 06/08/2018    Class 1 obesity due to excess calories without serious comorbidity with body mass index (BMI) of 32.0 to 32.9 in adult 03/02/2018    Gastroesophageal reflux disease without esophagitis 09/19/2016    Sacroiliac joint dysfunction of both sides 09/19/2016    TIA (transient ischemic attack) 09/29/2014     Past Medical History:   Diagnosis Date    Cardiac nuclear imaging test 09/30/2014    Low risk. No ischemia or prior infarction. EF 63%. No RWMA. Normal EKG on pharm stress test.    Cardiac transesophageal echocardiography (TASHA) 09/30/2014    EF 55%. Very sm R-L shunt suggestive of PFO. No LA appendage thrombus.       Cardiovascular LLE venous duplex 07/14/2015 Left leg:  No DVT. Past Surgical History:   Procedure Laterality Date    HX  SECTION  0910,0636      Prior to Admission medications    Medication Sig Start Date End Date Taking? Authorizing Provider   Omeprazole delayed release (PRILOSEC D/R) 20 mg tablet Take 1 Tab by mouth daily. Indications: gastroesophageal reflux disease 3/2/18   Oziel Osuna MD   aspirin delayed-release 81 mg tablet Take  by mouth daily. Historical Provider     No Known Allergies   Social History   Substance Use Topics    Smoking status: Never Smoker    Smokeless tobacco: Never Used    Alcohol use No      No family history on file. Review of Systems    Review of Systems   Cardiovascular: Negative for chest pain. Neurological: Positive for weakness (left arm, left leg) and numbness (left face, arm, and leg). All other systems reviewed and are negative.       Objective:     Patient Vitals for the past 8 hrs:   BP Temp Pulse Resp SpO2 Height Weight   18 1530 118/57 - 64 15 100 % - -   18 1330 115/56 - (!) 55 13 100 % - -   18 1327 111/64 - - 21 - - -   18 1125 111/71 98.1 °F (36.7 °C) 75 19 99 % 5' 3\" (1.6 m) 72.6 kg (160 lb)       General Exam  No acute distress, normal body habitus    HEENT: Normocephalic, atraumatic, Sclera anicteric, normal conjunctiva  Mucous membranes: normal color and hydration     CV: No carotid bruits,   Heart: regular to rate and rhythm.  No murmurs     Neurologic Exam:    Mental status:  Alert, oriented to person, place, time and circumstance  No visual spatial neglect or overt apraxia  Language: normal fluency and comprehension    Cranial nerves: PERRL, Extraocular movements intact and full, no nystagmus, intact sensation V1-V3 bilateral, face symmetric to movement, hearing intact,  SCM 5/5, Tongue midline with normal strength, palat symmetric    Motor: strength 5/5 throughout  No abnormal movements    Coordination: Normal finger-nose-finger,    DTRs (R/L) Biceps: (2/2)  Brachorad (2/2)  Triceps: (2/2)   Patellar (2/2)  Ankles (2/2)      Sensation: Intact and symmetric to light touch, temperature and vibratory sense    No pronator drift    Gait: not tested    Data Review:    Recent Results (from the past 24 hour(s))   GLUCOSE, POC    Collection Time: 06/08/18 11:32 AM   Result Value Ref Range    Glucose (POC) 117 (H) 70 - 110 mg/dL   CBC WITH AUTOMATED DIFF    Collection Time: 06/08/18 12:32 PM   Result Value Ref Range    WBC 3.9 (L) 4.6 - 13.2 K/uL    RBC 3.36 (L) 4.20 - 5.30 M/uL    HGB 10.8 (L) 12.0 - 16.0 g/dL    HCT 32.5 (L) 35.0 - 45.0 %    MCV 96.7 74.0 - 97.0 FL    MCH 32.1 24.0 - 34.0 PG    MCHC 33.2 31.0 - 37.0 g/dL    RDW 12.3 11.6 - 14.5 %    PLATELET 249 177 - 222 K/uL    MPV 10.9 9.2 - 11.8 FL    NEUTROPHILS 47 40 - 73 %    LYMPHOCYTES 44 21 - 52 %    MONOCYTES 7 3 - 10 %    EOSINOPHILS 2 0 - 5 %    BASOPHILS 0 0 - 2 %    ABS. NEUTROPHILS 1.8 1.8 - 8.0 K/UL    ABS. LYMPHOCYTES 1.7 0.9 - 3.6 K/UL    ABS. MONOCYTES 0.3 0.05 - 1.2 K/UL    ABS. EOSINOPHILS 0.1 0.0 - 0.4 K/UL    ABS. BASOPHILS 0.0 0.0 - 0.06 K/UL    DF AUTOMATED     METABOLIC PANEL, COMPREHENSIVE    Collection Time: 06/08/18 12:32 PM   Result Value Ref Range    Sodium 142 136 - 145 mmol/L    Potassium 3.7 3.5 - 5.5 mmol/L    Chloride 109 (H) 100 - 108 mmol/L    CO2 30 21 - 32 mmol/L    Anion gap 3 3.0 - 18 mmol/L    Glucose 109 (H) 74 - 99 mg/dL    BUN 11 7.0 - 18 MG/DL    Creatinine 0.60 0.6 - 1.3 MG/DL    BUN/Creatinine ratio 18 12 - 20      GFR est AA >60 >60 ml/min/1.73m2    GFR est non-AA >60 >60 ml/min/1.73m2    Calcium 9.8 8.5 - 10.1 MG/DL    Bilirubin, total 0.3 0.2 - 1.0 MG/DL    ALT (SGPT) 28 13 - 56 U/L    AST (SGOT) 19 15 - 37 U/L    Alk.  phosphatase 82 45 - 117 U/L    Protein, total 7.1 6.4 - 8.2 g/dL    Albumin 3.3 (L) 3.4 - 5.0 g/dL    Globulin 3.8 2.0 - 4.0 g/dL    A-G Ratio 0.9 0.8 - 1.7     PROTHROMBIN TIME + INR    Collection Time: 06/08/18 12:32 PM   Result Value Ref Range Prothrombin time 13.7 11.5 - 15.2 sec    INR 1.1 0.8 - 1.2     PTT    Collection Time: 06/08/18 12:32 PM   Result Value Ref Range    aPTT 29.2 23.0 - 36.4 SEC   CARDIAC PANEL,(CK, CKMB & TROPONIN)    Collection Time: 06/08/18 12:32 PM   Result Value Ref Range     26 - 192 U/L    CK - MB 1.8 <3.6 ng/ml    CK-MB Index 1.0 0.0 - 4.0 %    Troponin-I, Qt. <0.02 0.0 - 0.045 NG/ML   EKG, 12 LEAD, INITIAL    Collection Time: 06/08/18 12:51 PM   Result Value Ref Range    Ventricular Rate 54 BPM    Atrial Rate 54 BPM    P-R Interval 146 ms    QRS Duration 74 ms    Q-T Interval 382 ms    QTC Calculation (Bezet) 362 ms    Calculated P Axis 68 degrees    Calculated R Axis 58 degrees    Calculated T Axis 11 degrees    Diagnosis       Sinus bradycardia  Nonspecific T wave abnormality  Abnormal ECG  When compared with ECG of 24-OCT-2016 11:15,  No significant change was found         Radiology studies:       Assessment: This is a 47 yo woman with acute onset left hemiparesis and hemiparesthesia involving the face, with involuntarily movements of the left corner of the face noted by the patient at the inset of the symptoms. The Neurological exam is non-focal.   My diff dx includes: TIA, complex migraine headaches, or a focal seizure with Ronni paralysis although  I feel this is unlikely because I had no focal findings on my exam.       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 Active Problems:    CVA (cerebral vascular accident) ([facility] [address] (6/8/2018)        Plan:   - Brain MRI and MRA head with natacha because aneurysm  seen before on MRA  - monitor overnight   - OK with ASA 81 mg for now  - expect discharge the following day    Addendum:  - Brain MRI was reviewed, no acute intracranial pathology. - I don't have an explanation of her symptoms, possible complex migraine .   The aneurysm described in 2014 is not visible on this MRA,.   - She can be discharge home and f/u with her primary care doctor.      Hermila Suero MD  Adult Neurologist  6/8/2018

## 2018-06-08 NOTE — ED PROVIDER NOTES
EMERGENCY DEPARTMENT HISTORY AND PHYSICAL EXAM    11:35 AM      Date: 2018  Patient Name: Gwen Santo    History of Presenting Illness     Chief Complaint   Patient presents with    Extremity Weakness         History Provided By: Patient    Chief Complaint: Numbness  Duration:  Hours  Timing:  Intermittent  Location: Left face  Quality: N/A  Severity: N/A  Modifying Factors: None  Associated Symptoms: Left arm and leg numbness/ weakness    Additional History (Context): Gwen Santo is a 46 y.o. female with a h/o CAD who presents to the ED complaining of intermittent left facial numbness that began at 0500 when the patient woke up with associated intermittent left arm and leg numbness/ weakness. The patient's last normal was 0200 this morning when the patient went to sleep. She notes that she had a lip droop as well when she awoke but it has since resolved. She reports experiencing similar sx on her right side 2 weeks ago but states that she did not seek out medical help at the time. The patient denies CP. No other complaints or concerns at this time. PCP: Jerrod Ruiz MD    Current Facility-Administered Medications   Medication Dose Route Frequency Provider Last Rate Last Dose    0.9% sodium chloride infusion  100 mL/hr IntraVENous Silvia Reagan MD        atorvastatin (LIPITOR) tablet 80 mg  80 mg Oral DAILY Jason Gómez MD           Past History     Past Medical History:  Past Medical History:   Diagnosis Date    Cardiac nuclear imaging test 2014    Low risk. No ischemia or prior infarction. EF 63%. No RWMA. Normal EKG on pharm stress test.    Cardiac transesophageal echocardiography (TASHA) 2014    EF 55%. Very sm R-L shunt suggestive of PFO. No LA appendage thrombus.  Cardiovascular LLE venous duplex 2015    Left leg:  No DVT.        Past Surgical History:  Past Surgical History:   Procedure Laterality Date    HX  SECTION  8212,6923       Family History:  History reviewed. No pertinent family history. Social History:  Social History   Substance Use Topics    Smoking status: Never Smoker    Smokeless tobacco: Never Used    Alcohol use No       Allergies:  No Known Allergies      Review of Systems       Review of Systems   Cardiovascular: Negative for chest pain. Neurological: Positive for weakness (left arm, left leg) and numbness (left face, arm, and leg). All other systems reviewed and are negative. Physical Exam     Patient Vitals for the past 12 hrs:   Temp Pulse Resp BP SpO2   06/08/18 1530 - 64 15 118/57 100 %   06/08/18 1330 - (!) 55 13 115/56 100 %   06/08/18 1327 - - 21 111/64 -   06/08/18 1125 98.1 °F (36.7 °C) 75 19 111/71 99 %       Physical Exam   Constitutional: She is oriented to person, place, and time. She appears well-developed. HENT:   Head: Normocephalic and atraumatic. Eyes: Conjunctivae and EOM are normal.   Neck: Normal range of motion. Cardiovascular: Normal heart sounds. Exam reveals no gallop and no friction rub. No murmur heard. Pulmonary/Chest: Effort normal and breath sounds normal. No stridor. Abdominal: Soft. There is no tenderness. Musculoskeletal: Normal range of motion. She exhibits no tenderness. Neurological: She is alert and oriented to person, place, and time. Left arm drift  Numbness left face, arm, and leg   Skin: Skin is warm and dry. She is not diaphoretic. Psychiatric: She has a normal mood and affect. Her behavior is normal.   Nursing note and vitals reviewed.         Diagnostic Study Results     Labs -  Recent Results (from the past 12 hour(s))   GLUCOSE, POC    Collection Time: 06/08/18 11:32 AM   Result Value Ref Range    Glucose (POC) 117 (H) 70 - 110 mg/dL   CBC WITH AUTOMATED DIFF    Collection Time: 06/08/18 12:32 PM   Result Value Ref Range    WBC 3.9 (L) 4.6 - 13.2 K/uL    RBC 3.36 (L) 4.20 - 5.30 M/uL    HGB 10.8 (L) 12.0 - 16.0 g/dL    HCT 32.5 (L) 35.0 - 45.0 %    MCV 96.7 74.0 - 97.0 FL    MCH 32.1 24.0 - 34.0 PG    MCHC 33.2 31.0 - 37.0 g/dL    RDW 12.3 11.6 - 14.5 %    PLATELET 599 615 - 368 K/uL    MPV 10.9 9.2 - 11.8 FL    NEUTROPHILS 47 40 - 73 %    LYMPHOCYTES 44 21 - 52 %    MONOCYTES 7 3 - 10 %    EOSINOPHILS 2 0 - 5 %    BASOPHILS 0 0 - 2 %    ABS. NEUTROPHILS 1.8 1.8 - 8.0 K/UL    ABS. LYMPHOCYTES 1.7 0.9 - 3.6 K/UL    ABS. MONOCYTES 0.3 0.05 - 1.2 K/UL    ABS. EOSINOPHILS 0.1 0.0 - 0.4 K/UL    ABS. BASOPHILS 0.0 0.0 - 0.06 K/UL    DF AUTOMATED     METABOLIC PANEL, COMPREHENSIVE    Collection Time: 06/08/18 12:32 PM   Result Value Ref Range    Sodium 142 136 - 145 mmol/L    Potassium 3.7 3.5 - 5.5 mmol/L    Chloride 109 (H) 100 - 108 mmol/L    CO2 30 21 - 32 mmol/L    Anion gap 3 3.0 - 18 mmol/L    Glucose 109 (H) 74 - 99 mg/dL    BUN 11 7.0 - 18 MG/DL    Creatinine 0.60 0.6 - 1.3 MG/DL    BUN/Creatinine ratio 18 12 - 20      GFR est AA >60 >60 ml/min/1.73m2    GFR est non-AA >60 >60 ml/min/1.73m2    Calcium 9.8 8.5 - 10.1 MG/DL    Bilirubin, total 0.3 0.2 - 1.0 MG/DL    ALT (SGPT) 28 13 - 56 U/L    AST (SGOT) 19 15 - 37 U/L    Alk.  phosphatase 82 45 - 117 U/L    Protein, total 7.1 6.4 - 8.2 g/dL    Albumin 3.3 (L) 3.4 - 5.0 g/dL    Globulin 3.8 2.0 - 4.0 g/dL    A-G Ratio 0.9 0.8 - 1.7     PROTHROMBIN TIME + INR    Collection Time: 06/08/18 12:32 PM   Result Value Ref Range    Prothrombin time 13.7 11.5 - 15.2 sec    INR 1.1 0.8 - 1.2     PTT    Collection Time: 06/08/18 12:32 PM   Result Value Ref Range    aPTT 29.2 23.0 - 36.4 SEC   CARDIAC PANEL,(CK, CKMB & TROPONIN)    Collection Time: 06/08/18 12:32 PM   Result Value Ref Range     26 - 192 U/L    CK - MB 1.8 <3.6 ng/ml    CK-MB Index 1.0 0.0 - 4.0 %    Troponin-I, Qt. <0.02 0.0 - 0.045 NG/ML   EKG, 12 LEAD, INITIAL    Collection Time: 06/08/18 12:51 PM   Result Value Ref Range    Ventricular Rate 54 BPM    Atrial Rate 54 BPM    P-R Interval 146 ms    QRS Duration 74 ms    Q-T Interval 382 ms    QTC Calculation (Bezet) 362 ms    Calculated P Axis 68 degrees    Calculated R Axis 58 degrees    Calculated T Axis 11 degrees    Diagnosis       Sinus bradycardia  Nonspecific T wave abnormality  Abnormal ECG  When compared with ECG of 24-OCT-2016 11:15,  No significant change was found         Radiologic Studies    Ct Head Wo Cont    Result Date: 6/8/2018    CT scan of head, without contrast: Indication: Left-sided numbness in this morning. CODE S. TECHNIQUE: Contiguous 5 mm thick axial sections of brain have been obtained without intravenous contrast. [2-D coronal and sagittal images reformatted.] The study has been performed utilizing appropriate radiation dose reduction technique according to specification of the scanner, with modification of MAA/KV and appropriate collimation adjusted to patient's body habitus. COMPARISON STUDY: [CT scan of head on 01/06/2016. ------------------------------------- FINDINGS: Intracranial hemorrhage :[None.] Intracranial mass lesion:[ None.] Midline shift: [None.] Cerebral cortical atrophy:[ None]. Cerebral central atrophy:[ None.] Chronic microvascular ischemic changes/aging changes in white matter:[ None.] Acute cortical infarction:[ None.] Old cerebral infarction: [None.] Basal ganglia structures of both sides:[ No diagnostic finding.] Bilateral thalami: No demonstrable abnormality. Brainstem:[ No diagnostic  finding] Cerebellum: No diagnostic finding. Calvarium and base of skull:[ No fracture or focal lesion]. In visualized portions of both orbits:[ No diagnostic finding.] In visualized portions of paranasal sinuses:[ No diagnostic finding.] In visualized base of l skull:[ No diagnostic finding.] ----------------------------------------------------------    IMPRESSION: No evidence of intracranial hemorrhages or any other definable acute intracranial abnormality. No definable focal abnormality in brain. No evidence of intracranial mass lesion or mass effect.  Findings have been reported by me to ER physician Dr. Ty Valle, at 1202 hours on 5/8/2018. Cta Head Neck W Wo Cont    Result Date: 6/8/2018  CTA OF  ARTERIES OF NECK AND HEAD: -------------------------------------------------------------- HISTORY: [Acute neurological deficit. CODE S. Follow-up CTA of neck and head after negative CT scan of head. TECHNIQUE: Following intravenous administration of  [18 ml of] [Isovue-370 contrast, dynamic[ 1.25] mm axial sections from the level of aortic arch up to level of vertex been obtained. Axial images are reformatted with slice thickness of 2.341 mm. 3-D MIP coronal, axial and sagittal scans bed images reformatted. Multilevel multiplanar CPR images reformatted. 3-D images reformatted and displayed with rotating frames. All CT scans at this facility are performed using dose optimization technique as appropriate to a  performed  examination, to include automated exposer control, adjustment mA and / or  KV according to patient size (including appropriate matching  for site specific examination), or use  of iterative  reconstruction technique. . COMPARISON STUDY:[ None.] -------------------------------------------------------------- FINDINGS: At the medial portion of apex of right lung there is a pulmonary nodule measuring 5.2 mm in diameter, as on image number 19/3 no other diagnostic finding in visualized upper portions of upper lobes of both lungs. --------------------------------------------------------------- CTA of NECK  [No evidence] of dissection involving carotid arterial systems or vertebral arteries of both sides. [No evidence] of extravasation of contrast into soft tissues of neck. Right common carotid artery: [No evidence] of stenosis. [ Erving Stephany calcified plaque Right common carotid bifurcation:[ No ]calcified plaques. [ Erving Stephany stenosis. Right internal carotid artery:[ No evidence of stenosis. ]. [ Erving Stephany calcified plaques. [KWGSAU tortuous]. Right external carotid artery: Normally patent.  Right vertebral artery: [normally patent.] Right vertebral artery is of small caliber. The lower end of right vertebral artery is partially obscured by the dense contrast in venous structures. No obvious stenosis. ---------------------------------------------- Left common carotid artery:[ No stenosis]. [ No significant plaque]. Left common carotid bifurcation: [No] stenosis. [ No significant ]plaque. Left internal carotid artery:[ No] stenosis. [ Maurice Orris calcified plaques. [XCTHTD Tortuous]. Left external carotid artery:[ Normally patent]. Left vertebral artery:[ Normally patent]. Any abnormalities in soft tissues of neck :[None]. Cervical spine: Mild reversal of cervical lordosis, which may be due to muscle spasm or positional. No definable fracture in cervical spine. Evidence of mild-to-moderate degenerative disc disease at C5-C6 level. ------------------------------------------------------------------- CTA of cerebral arteries: ------------------------------------------------------------------ The petrous, cavernous, paraclinoid and supraclinoid segments of right internal carotid artery:[ Patent. No Calcified plaques in cavernous segments. No obvious stenosis]. Right posterior communicating artery is patent and of moderate size. Right anterior cerebral artery and its major  branches:[ Normally patent]. Right middle cerebral artery and its major  branches : [normally patent]. The petrous, cavernous and paraclinoid as well as supraclinoid segments of left internal carotid artery: Normally patent without obvious calcified plaque and without obvious stenosis. Left posterior communicating artery is patent and of moderate size. Left anterior cerebral artery at its major  branches : [ normally patent]. Left middle cerebral artery and its major  branches :[ normally patent]. --------------------------------------------------- Posterior fossa circulations: Intracranial portions of vertebral arteries of both sides:[ Normally patent].  Right vertebral artery is of small caliber. Basilar artery: [Tortuous]. [ Patent without stenosis. ]. [ ] Posterior inferior cerebellar arteries of both sides :[ normally patent]. Anterior inferior cerebellar arteries of both sides:[No clearly visualized. 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 There is prominent patent right posterior communicating artery providing collateral circulation from right ICA to right PCA. Rest of right PCN major branches are normally patent. 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 There is prominent patent left posterior comminuting artery, providing collateral circulation from left ICA to left PCA. Rest of left PCA and their major branches are normally patent. All major dural sinuses:[ Opacified without dural  sinus thrombosis.] Any vascular abnormality :[ none evident] Any cerebral aneurysm:[ None demonstrated.] Any vascular malformation:[ None demonstrated.] Any demonstrable parenchymal abnormality in brain:[ None demonstrated.] Subdural or epidural hematomas:[ None demonstrated.] Any demonstrable subarachnoid hemorrhages:[ None evident. But subtle SAH may not be seen on CTA study.] Paranasal sinuses:[ Negative.] Orbits: [ negative] Skull and base of skull: Negative IMPRESSIONS: The common carotid and internal carotid arteries of both sides are normally patent. The vertebral arteries of both sides are normally patent in neck. No evidence of any compromise in the intracranial anterior circulation of bilateral internal carotid arteries. 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  But, there are patent and bilateral prominent posterior comminuting arteries, providing fetal collateral circulations from bilateral ICAs to bilateral PCAs. Otherwise there is no compromise in the vertebral basilar circulation. Xr Chest Port    Result Date: 6/8/2018  INDICATION:  Tingling and numbness. COMPARISON:  1/6/2016. FINDINGS: A portable AP radiograph of the chest was obtained at 1158 hours. The patient is on a cardiac monitor. The lungs are clear. The cardiac and mediastinal contours and pulmonary vascularity are normal. No acute osseous abnormality. IMPRESSION: No acute cardiopulmonary process. Medical Decision Making     Provider Notes (Medical Decision Making): Based on my history, physical exam, and diagnostic evaluation, the patient appears to have symptoms consistent with acute cerbral ischemia. The pt's diagnostic evaluation including laboratory data, EKG, Head CT and CTA and x-ray were unremarkable. There is no eveidence of an intracranial bleed and glucosse was normal. The pt does not meet criteria for tPa administration. However, based upon the history and clinical findings, The patient will require further workup for acute cerebral ischemia, The pt has been given aspirin in the ED. They have remained stable while in the emergency department. The patient is admitted and accepted by the admitting physician. I am the first provider for this patient. I reviewed the vital signs, available nursing notes, past medical history, past surgical history, family history and social history. Vital Signs-Reviewed the patient's vital signs. Pulse Oximetry Analysis -  99% on room air (Interpretation) WNL    EKG: Interpreted by the EP. Time Interpreted: 1251   Rate: 54   Rhythm: Sinus Bradycardia   Interpretation: T wave inversion in V4 otherwise normal,     Records Reviewed: Nursing Notes and Old Medical Records (Time of Review: 11:35 AM)    ED Course: Progress Notes, Reevaluation, and Consults:  11:46 AM Consult: I discussed care with Dr. Hyacinth Garg, tele-neurology.  It was a standard discussion, including history of patients chief complaint, available diagnostic results, and treatment course. He agrees to see the patient. 12:19 PM I spoke with Dr. Chapincito Nunes again he recommends Asprin, Plavix, Statins, and CTA. 1:48 PM I discussed care with Dr. Chapincito Nunes. Discussed patient's CT results. He states add MRI and admit the patient. 1:57 PM Consult: I discussed care with Dr. Melia Coppola, neurologist. It was a standard discussion, including history of patients chief complaint, available diagnostic results, and treatment course. She agrees to consult. 2:08 PM Consult: I discussed care with Dr. Rocco Beltran. It was a standard discussion, including history of patients chief complaint, available diagnostic results, and treatment course. She agrees to admit the patient. Upon my evaluation, this patient had a high probability of imminent or life-threatening deterioration due to concern for stroke, which required my direct attention, intervention, and personal management. I have personally provided 35 minutes of critical care time exclusive of time spent on separately billable procedures. Time includes review of laboratory data, radiology results, discussion with consultants, and monitoring for potential decompensation. Interventions were performed as documented above. For Hospitalized Patients:    1. Hospitalization Decision Time:  The decision to hospitalize the patient was made by Dr. Essence Keane at [facility] on 6/8/2018     2. Aspirin: Aspirin was given    Diagnosis     Clinical Impression:   1. Cerebrovascular accident (CVA), unspecified mechanism ([facility] [address]        Disposition: Admit    Follow-up Information     None           Current Discharge Medication List      CONTINUE these medications which have NOT CHANGED    Details   Omeprazole delayed release (PRILOSEC D/R) 20 mg tablet Take 1 Tab by mouth daily.  Indications: gastroesophageal reflux disease  Qty: 30 Tab, Refills: 11    Associated Diagnoses: Gastroesophageal reflux disease without esophagitis      aspirin delayed-release 81 mg tablet Take  by mouth daily. _______________________________    Attestations:  [email or web address] acting as a scribe for and in the presence of Arron Coats MD      June 08, 2018 at 11:35 AM       Provider Attestation:      I personally performed the services described in the documentation, reviewed the documentation, as recorded by the scribe in my presence, and it accurately and completely records my words and actions.  June 08, 2018 at 11:35 AM - Arron Coats MD    _______________________________

## 2018-06-09 VITALS
HEIGHT: 63 IN | TEMPERATURE: 98.1 F | SYSTOLIC BLOOD PRESSURE: 99 MMHG | DIASTOLIC BLOOD PRESSURE: 64 MMHG | OXYGEN SATURATION: 98 % | RESPIRATION RATE: 17 BRPM | HEART RATE: 64 BPM | WEIGHT: 187.5 LBS | BODY MASS INDEX: 33.22 KG/M2

## 2018-06-09 LAB
ATRIAL RATE: 54 BPM
CALCULATED P AXIS, ECG09: 68 DEGREES
CALCULATED R AXIS, ECG10: 58 DEGREES
CALCULATED T AXIS, ECG11: 11 DEGREES
CHOLEST SERPL-MCNC: 149 MG/DL
DIAGNOSIS, 93000: NORMAL
EST. AVERAGE GLUCOSE BLD GHB EST-MCNC: NORMAL MG/DL
GLUCOSE BLD STRIP.AUTO-MCNC: 78 MG/DL (ref 70–110)
GLUCOSE BLD STRIP.AUTO-MCNC: 96 MG/DL (ref 70–110)
HBA1C MFR BLD: 4.3 % (ref 4.2–5.6)
HDLC SERPL-MCNC: 51 MG/DL (ref 40–60)
HDLC SERPL: 2.9 {RATIO} (ref 0–5)
LDLC SERPL CALC-MCNC: 88.8 MG/DL (ref 0–100)
LIPID PROFILE,FLP: NORMAL
P-R INTERVAL, ECG05: 146 MS
Q-T INTERVAL, ECG07: 382 MS
QRS DURATION, ECG06: 74 MS
QTC CALCULATION (BEZET), ECG08: 362 MS
TRIGL SERPL-MCNC: 46 MG/DL (ref ?–150)
VENTRICULAR RATE, ECG03: 54 BPM
VLDLC SERPL CALC-MCNC: 9.2 MG/DL

## 2018-06-09 PROCEDURE — 92526 ORAL FUNCTION THERAPY: CPT

## 2018-06-09 PROCEDURE — 74011250636 HC RX REV CODE- 250/636: Performed by: INTERNAL MEDICINE

## 2018-06-09 PROCEDURE — 74011250637 HC RX REV CODE- 250/637: Performed by: INTERNAL MEDICINE

## 2018-06-09 PROCEDURE — 92610 EVALUATE SWALLOWING FUNCTION: CPT

## 2018-06-09 PROCEDURE — 36415 COLL VENOUS BLD VENIPUNCTURE: CPT | Performed by: INTERNAL MEDICINE

## 2018-06-09 PROCEDURE — 74011250637 HC RX REV CODE- 250/637: Performed by: EMERGENCY MEDICINE

## 2018-06-09 PROCEDURE — 82962 GLUCOSE BLOOD TEST: CPT

## 2018-06-09 PROCEDURE — 83036 HEMOGLOBIN GLYCOSYLATED A1C: CPT | Performed by: INTERNAL MEDICINE

## 2018-06-09 PROCEDURE — 97165 OT EVAL LOW COMPLEX 30 MIN: CPT

## 2018-06-09 PROCEDURE — 80061 LIPID PANEL: CPT | Performed by: INTERNAL MEDICINE

## 2018-06-09 RX ORDER — ACETAMINOPHEN 325 MG/1
650 TABLET ORAL
Status: DISCONTINUED | OUTPATIENT
Start: 2018-06-09 | End: 2018-06-09 | Stop reason: HOSPADM

## 2018-06-09 RX ORDER — ATORVASTATIN CALCIUM 80 MG/1
80 TABLET, FILM COATED ORAL DAILY
Qty: 10 TAB | Refills: 0 | Status: SHIPPED | OUTPATIENT
Start: 2018-06-10 | End: 2018-06-19 | Stop reason: SDUPTHER

## 2018-06-09 RX ADMIN — ATORVASTATIN CALCIUM 80 MG: 40 TABLET, FILM COATED ORAL at 09:19

## 2018-06-09 RX ADMIN — HEPARIN SODIUM 5000 UNITS: 5000 INJECTION, SOLUTION INTRAVENOUS; SUBCUTANEOUS at 09:19

## 2018-06-09 RX ADMIN — ACETAMINOPHEN 650 MG: 325 TABLET ORAL at 04:38

## 2018-06-09 RX ADMIN — ASPIRIN 81 MG: 81 TABLET, COATED ORAL at 09:19

## 2018-06-09 RX ADMIN — HEPARIN SODIUM 5000 UNITS: 5000 INJECTION, SOLUTION INTRAVENOUS; SUBCUTANEOUS at 03:27

## 2018-06-09 NOTE — PROGRESS NOTES
Discharge instructions given verbally and written all questions answered IV, tele, and armband discontinued family at bedside to transport pt home one prescription given with detailed instructions

## 2018-06-09 NOTE — DISCHARGE SUMMARY
Discharge Summary     Patient ID:  Laura Arreola  [id number]  46 y.o.  1970  Body mass index is 33.21 kg/(m^2). PCP on record: Krystal Amanda MD    Admit date: 6/8/2018  Discharge date and time: 6/9/2018    Discharge Diagnoses:                                             1 Left facial paraesthesia   2 TIA   3 Menstrual migraine         Consults: Neurology          Hospital Course by problems:  HPI per admitting MD   \"Bere Leija is a 46 y.o.  female who has  Hx of migrane  And small PFO   Presents to ER with left mouth twitching and left arm and left leg tingling symptoms lasted under 2 hrs had symptoms early today and yesterday symptoms associated with HA normally gets menstrual migranes but has not had cycle in months  Denies chest pain or shortness of breath   In ER code S called CT and CTA essentially negative except for hypoplastic congenital basilar vein \"    Patient admitted for suspicion for acute CVA - MRI /CT head ruled out Acute CVA   Neurology consulted   Patient cleared by neurology   Will continue statin and ASA   Patient is completely asymptomatic on discharge day        Patient seen and examined by me on discharge day. Pertinent Findings:  Patient is Alert Awake and oriented   HEENT - NAD    RS - Clear , no rales no rhonchi   CVS - regular rhythm and rate acceptable    abd - benign, BS present , no Distension   EXT - no edema , no calf tenderness   Neuro - alert and awake , grossly motor and sensory intact       Significant Diagnostic Studies:   CT scan of head, without contrast:           Indication:     Left-sided numbness in this morning.     CODE S.     TECHNIQUE:     Contiguous 5 mm thick axial sections of brain have been obtained without  intravenous contrast.        [2-D coronal and sagittal images reformatted. ]      The study has been performed utilizing appropriate radiation dose reduction  technique according to specification of the scanner, with modification of MAA/KV  and appropriate collimation adjusted to patient's body habitus.              COMPARISON STUDY: [CT scan of head on 01/06/2016.  -------------------------------------        FINDINGS:           Intracranial hemorrhage :[None.]     Intracranial mass lesion:[ None.]     Midline shift: [None.]     Cerebral cortical atrophy:[ None].     Cerebral central atrophy:[ None.]     Chronic microvascular ischemic changes/aging changes in white matter:[ None.]     Acute cortical infarction:[ None.]     Old cerebral infarction: [None.]     Basal ganglia structures of both sides:[ No diagnostic finding.]     Bilateral thalami: No demonstrable abnormality.     Brainstem:[ No diagnostic  finding]     Cerebellum: No diagnostic finding.     Calvarium and base of skull:[ No fracture or focal lesion].    In visualized portions of both orbits:[ No diagnostic finding.]     In visualized portions of paranasal sinuses:[ No diagnostic finding.]     In visualized base of l skull:[ No diagnostic finding.]     ----------------------------------------------------------  IMPRESSION  IMPRESSION:     No evidence of intracranial hemorrhages or any other definable acute  intracranial abnormality.     No definable focal abnormality in brain.     No evidence of intracranial mass lesion or mass effect.     Findings have been reported by me to  physician Dr. Sherice Nair, at 1202 hours on  5/8/2018. CTA OF  ARTERIES OF NECK AND HEAD:  --------------------------------------------------------------        HISTORY: [Acute neurological deficit. CODE S.   Follow-up CTA of neck and head after negative CT scan of head.     TECHNIQUE:     Following intravenous administration of  [18 ml of] [Isovue-370 contrast,  dynamic[ 1.25] mm axial sections from the level of aortic arch up to level of  vertex been obtained.     Axial images are reformatted with slice thickness of 9.436 mm.     3-D MIP coronal, axial and sagittal scans bed images reformatted.     Multilevel multiplanar CPR images reformatted.     3-D images reformatted and displayed with rotating frames.     All CT scans at this facility are performed using dose optimization technique as  appropriate to a  performed  examination, to include automated exposer control,  adjustment mA and / or  KV according to patient size (including appropriate  matching  for site specific examination), or use  of iterative  reconstruction  technique. .           COMPARISON STUDY:[ None.]     --------------------------------------------------------------     FINDINGS:     At the medial portion of apex of right lung there is a pulmonary nodule  measuring 5.2 mm in diameter, as on image number 19/3 no other diagnostic  finding in visualized upper portions of upper lobes of both lungs.     ---------------------------------------------------------------     CTA of NECK            [No evidence] of dissection involving carotid arterial systems or vertebral  arteries of both sides. [No evidence] of extravasation of contrast into soft tissues of neck.     Right common carotid artery: [No evidence] of stenosis. [ Erskin Knife calcified plaque     Right common carotid bifurcation:[ No ]calcified plaques. [ Erskin Knife stenosis.     Right internal carotid artery:[ No evidence of stenosis. ]. [ Erskin Knife calcified  plaques. Simón.Frater tortuous].     Right external carotid artery: Normally patent.     Right vertebral artery: [normally patent.] Right vertebral artery is of small  caliber. The lower end of right vertebral artery is partially obscured by the  dense contrast in venous structures. No obvious stenosis.     ----------------------------------------------     Left common carotid artery:[ No stenosis]. [ No significant plaque].    Left common carotid bifurcation: [No] stenosis. [ No significant ]plaque.     Left internal carotid artery:[ No] stenosis. [ Erskin Knife calcified plaques. [DNGQZL  Tortuous].      Left external carotid artery:[ Normally patent].    Left vertebral artery:[ Normally patent].    Any abnormalities in soft tissues of neck :[None].     Cervical spine: Mild reversal of cervical lordosis, which may be due to muscle  spasm or positional. No definable fracture in cervical spine. Evidence of  mild-to-moderate degenerative disc disease at C5-C6 level.     -------------------------------------------------------------------     CTA of cerebral arteries:     ------------------------------------------------------------------     The petrous, cavernous, paraclinoid and supraclinoid segments of right internal  carotid artery:[ Patent. No Calcified plaques in cavernous segments. No obvious  stenosis].     Right posterior communicating artery is patent and of moderate size.     Right anterior cerebral artery and its major  branches:[ Normally patent].     Right middle cerebral artery and its major  branches : [normally patent].    The petrous, cavernous and paraclinoid as well as supraclinoid segments of left  internal carotid artery: Normally patent without obvious calcified plaque and  without obvious stenosis.     Left posterior communicating artery is patent and of moderate size.     Left anterior cerebral artery at its major  branches : [ normally patent].    Left middle cerebral artery and its major  branches :[ normally patent].    ---------------------------------------------------     Posterior fossa circulations:     Intracranial portions of vertebral arteries of both sides:[ Normally patent]. Right vertebral artery is of small caliber.     Basilar artery: [Tortuous]. [ Patent without stenosis. ]. [ ]     Posterior inferior cerebellar arteries of both sides :[ normally patent].    Anterior inferior cerebellar arteries of both sides:[No clearly visualized.     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 There is prominent patent right posterior  communicating artery providing collateral circulation from right ICA to right  PCA. Rest of right PCN major branches are normally patent.     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 There is prominent patent left posterior  comminuting artery, providing collateral circulation from left ICA to left PCA. Rest of left PCA and their major branches are normally patent.     All major dural sinuses:[ Opacified without dural  sinus thrombosis. ]     Any vascular abnormality :[ none evident]     Any cerebral aneurysm:[ None demonstrated. ]     Any vascular malformation:[ None demonstrated. ]           Any demonstrable parenchymal abnormality in brain:[ None demonstrated. ]     Subdural or epidural hematomas:[ None demonstrated. ]     Any demonstrable subarachnoid hemorrhages:[ None evident. But subtle SAH may not  be seen on CTA study. ]     Paranasal sinuses:[ Negative.]     Orbits: [ negative]     Skull and base of skull: Negative           IMPRESSIONS:     The common carotid and internal carotid arteries of both sides are normally  patent.     The vertebral arteries of both sides are normally patent in neck.     No evidence of any compromise in the intracranial anterior circulation of  bilateral internal carotid arteries.     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 But, there are  patent and bilateral prominent posterior comminuting arteries, providing fetal  collateral circulations from bilateral ICAs to bilateral PCAs.   Otherwise there is no compromise in the vertebral basilar circulation.               CT scan of head, without contrast:           Indication:     Left-sided numbness in this morning.     CODE S.     TECHNIQUE:     Contiguous 5 mm thick axial sections of brain have been obtained without  intravenous contrast.        [2-D coronal and sagittal images reformatted. ]      The study has been performed utilizing appropriate radiation dose reduction  technique according to specification of the scanner, with modification of MAA/KV  and appropriate collimation adjusted to patient's body habitus.              COMPARISON STUDY: [CT scan of head on 01/06/2016.  -------------------------------------        FINDINGS:           Intracranial hemorrhage :[None.]     Intracranial mass lesion:[ None.]     Midline shift: [None.]     Cerebral cortical atrophy:[ None].     Cerebral central atrophy:[ None.]     Chronic microvascular ischemic changes/aging changes in white matter:[ None.]     Acute cortical infarction:[ None.]     Old cerebral infarction: [None.]     Basal ganglia structures of both sides:[ No diagnostic finding.]     Bilateral thalami: No demonstrable abnormality.     Brainstem:[ No diagnostic  finding]     Cerebellum: No diagnostic finding.     Calvarium and base of skull:[ No fracture or focal lesion].      In visualized portions of both orbits:[ No diagnostic finding.]     In visualized portions of paranasal sinuses:[ No diagnostic finding.]     In visualized base of l skull:[ No diagnostic finding.]     ----------------------------------------------------------  IMPRESSION  IMPRESSION:     No evidence of intracranial hemorrhages or any other definable acute  intracranial abnormality.     No definable focal abnormality in brain.     No evidence of intracranial mass lesion or mass effect.     Findings have been reported by me to ER physician Dr. Mey Barker, at 1202 hours on  5/8/2018.          Pertinent Lab Data:  Recent Labs      06/08/18   1232   WBC  3.9*   HGB  10.8*   HCT  32.5*   PLT  170     Recent Labs      06/08/18   1232   NA  142   K  3.7   CL  109*   CO2  30   GLU  109*   BUN  11   CREA  0.60   CA  9.8   ALB  3.3*   SGOT  19   ALT  28   INR  1.1       DISCHARGE MEDICATIONS:   @  Current Discharge Medication List      START taking these medications    Details   atorvastatin (LIPITOR) 80 mg tablet Take 1 Tab by mouth daily. Qty: 10 Tab, Refills: 0         CONTINUE these medications which have NOT CHANGED    Details   Omeprazole delayed release (PRILOSEC D/R) 20 mg tablet Take 1 Tab by mouth daily. Indications: gastroesophageal reflux disease  Qty: 30 Tab, Refills: 11    Associated Diagnoses: Gastroesophageal reflux disease without esophagitis      aspirin delayed-release 81 mg tablet Take  by mouth daily. My Recommended Diet, Activity, Wound Care, and follow-up labs are listed in the patient's Discharge Insturctions which I have personally completed and reviewed. Disposition:     [x] Home with family     [] [facility] [place] PT/RN   [] SNF/NH   [] Inpatient Rehab/DANIAL  Condition at Discharge:  Stable    Follow up with:   PCP : Benjamin Jean MD      Please follow-up tests/labs that are still pendin.  None  2.    >30 minutes spent coordinating this discharge (review instructions/follow-up, prescriptions, preparing report for sign off)    Signed:  Dejuan Cohn MD  2018  12:54 PM

## 2018-06-09 NOTE — PROGRESS NOTES
Problem: Falls - Risk of  Goal: *Absence of Falls  Document Itz Fall Risk and appropriate interventions in the flowsheet.    Outcome: Progressing Towards Goal  Fall Risk Interventions:            Medication Interventions: (P) Patient to call before getting OOB, Teach patient to arise slowly

## 2018-06-09 NOTE — PROGRESS NOTES
Problem: Self Care Deficits Care Plan (Adult)  Goal: *Acute Goals and Plan of Care (Insert Text)  Occupational Therapy EVALUATION/discharge    Patient: Meagan Mcnally (52 y.o. female)  Date: 2018  Primary Diagnosis: CVA (cerebral vascular accident) ([facility] [address]  Stroke (cerebrum) ([facility] [address]  Precautions: none listed       ASSESSMENT AND RECOMMENDATIONS:  Based on the objective data described below, the patient presents with out functional deficits; therefore, skilled occupational therapy is not indicated at this time. Discharge Recommendations: None  Further Equipment Recommendations for Discharge: N/A      Barriers to Learning/Limitations: None    COMPLEXITY     Eval Complexity: History: LOW Complexity : Brief history review ; Examination: LOW Complexity : 1-3 performance deficits relating to physical, cognitive , or psychosocial skils that result in activity limitations and / or participation restrictions ; Decision Making:MEDIUM Complexity : Patient may present with comorbidities that affect occupational performnce. Miniml to moderate modification of tasks or assistance (eg, physical or verbal ) with assesment(s) is necessary to enable patient to complete evaluation  Assessment: LOW Complexity        G-CODES:     Self Care  Current  CH= 0%   Goal  CH= 0%   D/C  CH= 0%. The severity rating is based on the Level of Assistance required for Functional Mobility and ADLs. SUBJECTIVE:   Patient stated I think I am OK now.     OBJECTIVE DATA SUMMARY:     Past Medical History:   Diagnosis Date    Cardiac nuclear imaging test 2014    Low risk. No ischemia or prior infarction. EF 63%. No RWMA. Normal EKG on pharm stress test.    Cardiac transesophageal echocardiography (TASHA) 2014    EF 55%. Very sm R-L shunt suggestive of PFO. No LA appendage thrombus.  Cardiovascular LLE venous duplex 2015    Left leg:  No DVT.      Past Surgical History:   Procedure Laterality Date    HX  SECTION  1760,6078     Prior Level of Function/Home Situation: she was independent at home and in the community prior to this hospitalization  Home Situation  Home Environment: Apartment  One/Two Story Residence: One story  Living Alone: No  Support Systems: Family member(s)  Patient Expects to be Discharged to[de-identified] Apartment  Current DME Used/Available at Home: None  [x]     Right hand dominant   []     Left hand dominant  Cognitive/Behavioral Status:  Neurologic State: Alert  Orientation Level: Oriented X4  Skin: no skin integrity issues noted  Edema: no extremity edema noted  Vision/Perceptual:     tracking and conversion/diversion is WFL      Coordination:   BUE's WFL   Balance:   independent standing during LB clothes management  Strength:  BUE's: 4/5 (and symmetrical)   Tone & Sensation:  BUE's WFL   Range of Motion:  BUE's AROM is WFL   Functional Mobility and Transfers for ADLs:  Bed Mobility:   supine to sit and return to supine is independent   Transfers:   independent sit to stand and side stepping to the head of the bed   ADL Assessment:   self feeding: independent (simulated)  Grooming: independent (simulated)  UB bathing and dressing: independent (simulated)  LB bathing and dressing: modified independent   Pain:  Pt reports 0/10 pain or discomfort prior to treatment.    Pt reports 0/10 pain or discomfort post treatment. Activity Tolerance:   No SOB and no c/o fatigue noted  Please refer to the flowsheet for vital signs taken during this treatment. After treatment:   []  Patient left in no apparent distress sitting up in chair  [x]  Patient left in no apparent distress in bed  [x]  Call bell left within reach  []  Nursing notified  []  Caregiver present  []  Bed alarm activated    COMMUNICATION/EDUCATION:   Communication/Collaboration:  []      Home safety education was provided and the patient/caregiver indicated understanding.   [x]      Patient/family have participated as able and agree with findings and recommendations. []      Patient is unable to participate in plan of care at this time.     Patricia Marte, OTR/L  Time Calculation: 12 mins

## 2018-06-09 NOTE — PROGRESS NOTES
Primary Nurse Tae Sanchez RN and zhang Soriano RN performed a dual skin assessment on this patient No impairment noted  Juan score is 20

## 2018-06-09 NOTE — PROGRESS NOTES
Problem: Dysphagia (Adult)  Goal: *Acute Goals and Plan of Care (Insert Text)  Recommendations:  Diet: Dental soft, thin liquid  Meds: Per patient preference  Aspiration Precautions  Oral Care TID    Goals:  Patient will:  1. Tolerate PO trials with 0 s/s overt distress in 4/5 trials  2. Utilize compensatory swallow strategies/maneuvers (decrease bite/sip, size/rate, alt. liq/sol) with min cues in 4/5 trials        Outcome: Progressing Towards Goal    Speech LAnguage Pathology bedside swallow   evaluation & TREATMENT     Patient: Sonal James (52 y.o. female)  Date: 6/9/2018  Primary Diagnosis: CVA (cerebral vascular accident) ([facility] [address]  Stroke (cerebrum) ([facility])        Precautions: Aspiration     PLOF: Independent  ASSESSMENT :  Based on the objective data described below, the patient presents with minimal oral dysphagia in the setting of CVA. Pt A&Ox4. Oral-motor exam revealed restricted mandible due to mild L side pain, otherwise structures grossly intact for mastication and deglutition. Basic cognitive-linguistic functioning appears intact. Pt exhibited adequate bolus cohesion, manipulation and A-P transit. Pt reported mild pain with mastication of solids. Further exhibited adequate swallow timing/reflex and hyolaryngeal excursion. Able to manipulate and clear with 0 clinical s/s aspiration. Pt safe for dental soft solid, thin liquid diet. D/w RN, Samira. Skilled therapy initiated; Educated pt on aspiration precautions and importance of compensatory swallow techniques to decrease aspiration risk (decrease rate of intake & sip/bite size, upright @HOB for all po intake and ~30 minutes after po); verbalized comprehension. ST will follow up 1-2x to assess diet tolerance and advancement. Patient will benefit from skilled intervention to address the above impairments.   Patients rehabilitation potential is considered to be Good  Factors which may influence rehabilitation potential include:   []            None noted  []            Mental ability/status  []            Medical condition  []            Home/family situation and support systems  []            Safety awareness  []            Pain tolerance/management  []            Other:      PLAN :  Recommendations and Planned Interventions:  Dental soft, thin liquid   Frequency/Duration: Patient will be followed by speech-language pathology 1-2 times per day/4-7 days per week to address goals. Discharge Recommendations: To Be Determined     SUBJECTIVE:   Patient stated It started hurting yesterday when all this happened. OBJECTIVE:     Past Medical History:   Diagnosis Date    Cardiac nuclear imaging test 2014    Low risk. No ischemia or prior infarction. EF 63%. No RWMA. Normal EKG on pharm stress test.    Cardiac transesophageal echocardiography (TASHA) 2014    EF 55%. Very sm R-L shunt suggestive of PFO. No LA appendage thrombus.  Cardiovascular LLE venous duplex 2015    Left leg:  No DVT.      Past Surgical History:   Procedure Laterality Date    HX  SECTION       Prior Level of Function/Home Situation: Independent  Home Situation  Home Environment: Apartment  One/Two Story Residence: One story  Living Alone: No  Support Systems: Family member(s)  Patient Expects to be Discharged to[de-identified] Apartment  Current DME Used/Available at Home: None  Diet prior to admission: Regular/thin  Current Diet:  Dental soft/thin    Cognitive and Communication Status:  Neurologic State: Alert  Orientation Level: Oriented X4  Cognition: Appropriate decision making, Appropriate for age attention/concentration, Appropriate safety awareness, Follows commands  Perception: Appears intact  Perseveration: No perseveration noted  Safety/Judgement: Good awareness of safety precautions  Oral Assessment:  Oral Assessment  Labial: No impairment  Dentition: Natural;Intact  Oral Hygiene: Good  Lingual: No impairment  Velum: No impairment  Mandible: Restricted  P.O. Trials:  Patient Position: [facility]  Vocal quality prior to P.O.: No impairment  Consistency Presented: Thin liquid; Solid;Puree  How Presented: Self-fed/presented;Straw;Successive swallows     Bolus Acceptance: No impairment  Bolus Formation/Control: No impairment     Propulsion: No impairment  Oral Residue: None  Initiation of Swallow: No impairment  Laryngeal Elevation: Functional  Aspiration Signs/Symptoms: None  Pharyngeal Phase Characteristics: No impairment, issues, or problems   Effective Modifications: None  Cues for Modifications: None       Oral Phase Severity: Minimal  Pharyngeal Phase Severity : No impairment    GCODESwallowing:  Swallow Current Status CI= 1-19%   Swallow Goal Status CH= 0%    The severity rating is based on the following outcomes:  BECKY Noms Swallow Level 6    Clinical Judgement    PAIN:  Start of Eval/Tx: 0  End of Eval/Tx: 0     After treatment:   []            Patient left in no apparent distress sitting up in chair  [x]            Patient left in no apparent distress in bed  [x]            Call bell left within reach  [x]            Nursing notified  []            Family present  []            Caregiver present  []            Bed alarm activated    COMMUNICATION/EDUCATION:   [x]            Safe swallowing guidelines; compensatory techniques. [x]            Patient/family have participated as able in goal setting and plan of care. [x]            Patient/family agree to work toward stated goals and plan of care. []            Patient understands intent and goals of therapy; neutral about participation. []            Patient unable to participate in goal setting/plan of care; educ ongoing with interdisciplinary staff  []         Posted safety precautions in patient's room.     Thank you for this referral.  FRANK Gabriel  Time Calculation: 19 mins  Evaluation Time: 10 minutes   Treatment Time: 9 minutes

## 2018-06-09 NOTE — PROGRESS NOTES
PT evaluation received. Pt is currently being discharged. Nursing ([facility]) states pt is independent with gait and has no PT needs at this time. Completing PT order. If anything changes, please re-order at that time.      Thank you,   Daiana Gregory, PT, DPT

## 2018-06-09 NOTE — PROGRESS NOTES
Speech Therapy Note:    SLP evaluation orders received. However, upon completion of chart review and discussion with RN, pt not appropriate for SLP evaluation this day. Currently, patient is:     (X) Tolerating current PO diet - regular/thin (per RN report)  ( ) Tolerating current po diet; however, poor po intake (per RN report)   ( ) Receiving nutrition via tube feeding  ( ) Lethargic, solomnent, or difficult to arouse for safe po trials     ( ) Unable to manage secretions  ( ) Receiving intervention for respiratory distress  ( ) <6 hours s/p extubation   ( ) Other:      Please contact SLP with questions/concerns.   SLP will follow up next day.     Thank you for this referral.    Jeff Jefferson M.S., CF-SLP  Speech Language Pathologist

## 2018-06-09 NOTE — DISCHARGE INSTRUCTIONS
Patient armband removed and shredded  MyChart Activation    Thank you for requesting access to [employer]. Please follow the instructions below to securely access and download your online medical record. [employer] allows you to send messages to your doctor, view your test results, renew your prescriptions, schedule appointments, and more. How Do I Sign Up? 1. In your internet browser, go to [email or web address]  2. Click on the First Time User? Click Here link in the Sign In box. You will be redirect to the New Member Sign Up page. 3. Enter your [employer] Access Code exactly as it appears below. You will not need to use this code after youve completed the sign-up process. If you do not sign up before the expiration date, you must request a new code. [employer] Access Code: Activation code not generated  Current [employer] Status: Active (This is the date your [employer] access code will )    4. Enter the last four digits of your Social Security Number (xxxx) and Date of Birth (mm/dd/yyyy) as indicated and click Submit. You will be taken to the next sign-up page. 5. Create a [employer] ID. This will be your [employer] login ID and cannot be changed, so think of one that is secure and easy to remember. 6. Create a [employer] password. You can change your password at any time. 7. Enter your Password Reset Question and Answer. This can be used at a later time if you forget your password. 8. Enter your e-mail address. You will receive e-mail notification when new information is available in [address] 9. Click Sign Up. You can now view and download portions of your medical record. 10. Click the Download Summary menu link to download a portable copy of your medical information. Additional Information    If you have questions, please visit the Frequently Asked Questions section of the [employer] website at [email or web address] Remember, [employer] is NOT to be used for urgent needs.  For medical emergencies, dial 911. DISCHARGE SUMMARY from Nurse    PATIENT INSTRUCTIONS:    What to do at Home:  Recommended activity: Activity as tolerated,     If you experience any of the following symptoms chest pains, shortness of breath, fever, chills, elevated temperature greater than 100.9 F, numbness or tingling in arms or legs, please follow up with nearest Emergency room. *  Please give a list of your current medications to your Primary Care Provider. *  Please update this list whenever your medications are discontinued, doses are      changed, or new medications (including over-the-counter products) are added. *  Please carry medication information at all times in case of emergency situations. These are general instructions for a healthy lifestyle:    No smoking/ No tobacco products/ Avoid exposure to second hand smoke  Surgeon General's Warning:  Quitting smoking now greatly reduces serious risk to your health. Obesity, smoking, and sedentary lifestyle greatly increases your risk for illness    A healthy diet, regular physical exercise & weight monitoring are important for maintaining a healthy lifestyle    You may be retaining fluid if you have a history of heart failure or if you experience any of the following symptoms:  Weight gain of 3 pounds or more overnight or 5 pounds in a week, increased swelling in our hands or feet or shortness of breath while lying flat in bed. Please call your doctor as soon as you notice any of these symptoms; do not wait until your next office visit. Recognize signs and symptoms of STROKE:    F-face looks uneven    A-arms unable to move or move unevenly    S-speech slurred or non-existent    T-time-call 911 as soon as signs and symptoms begin-DO NOT go       Back to bed or wait to see if you get better-TIME IS BRAIN. Warning Signs of HEART ATTACK     Call 911 if you have these symptoms:   Chest discomfort.  Most heart attacks involve discomfort in the center of the chest that lasts more than a few minutes, or that goes away and comes back. It can feel like uncomfortable pressure, squeezing, fullness, or pain.  Discomfort in other areas of the upper body. Symptoms can include pain or discomfort in one or both arms, the back, neck, jaw, or stomach.  Shortness of breath with or without chest discomfort.  Other signs may include breaking out in a cold sweat, nausea, or lightheadedness. Don't wait more than five minutes to call 911 - MINUTES MATTER! Fast action can save your life. Calling 911 is almost always the fastest way to get lifesaving treatment. Emergency Medical Services staff can begin treatment when they arrive -- up to an hour sooner than if someone gets to the hospital by car. The discharge information has been reviewed with the patient. The patient verbalized understanding. Discharge medications reviewed with the patient and appropriate educational materials and side effects teaching were provided.   ___________________________________________________________________________________________________________________________________

## 2018-06-09 NOTE — PROGRESS NOTES
Neurology Progress Note    Patient ID:  Simon Reyes  [id number]  46 y.o.  1970    Subjective:      Patient had no acute events over night, she reports some left TMJ pain, otherwise she is doing well. Brain MRI was negative for acute intracranial process as suspected. MRA showed no aneurysm . Current Facility-Administered Medications   Medication Dose Route Frequency    acetaminophen (TYLENOL) tablet 650 mg  650 mg Oral Q6H PRN    atorvastatin (LIPITOR) tablet 80 mg  80 mg Oral DAILY    aspirin delayed-release tablet 81 mg  81 mg Oral DAILY    sodium chloride (NS) flush 5-10 mL  5-10 mL IntraVENous Q8H    sodium chloride (NS) flush 5-10 mL  5-10 mL IntraVENous PRN    ondansetron (ZOFRAN) injection 4 mg  4 mg IntraVENous Q6H PRN    heparin (porcine) injection 5,000 Units  5,000 Units SubCUTAneous Q8H          Objective: Active hospital medications were reviewed    Lab results and neuroradiology studies from the last 24 hours were reviewed. Prior to Admission medications    Medication Sig Start Date End Date Taking? Authorizing Provider   Omeprazole delayed release (PRILOSEC D/R) 20 mg tablet Take 1 Tab by mouth daily. Indications: gastroesophageal reflux disease 3/2/18   El Mahajan MD   aspirin delayed-release 81 mg tablet Take  by mouth daily.     Historical Provider     Patient Vitals for the past 8 hrs:   BP Temp Pulse Resp SpO2 Weight   06/09/18 0804 99/57 98.1 °F (36.7 °C) 61 17 99 % -   06/09/18 0600 111/76 98.7 °F (37.1 °C) (!) 57 18 99 % -   06/09/18 0441 - - - - - 85 kg (187 lb 8 oz)   06/09/18 0400 98/66 98.4 °F (36.9 °C) 60 20 97 % -   [id number]/07 1901 - 06/09 0700  In: 640 [P.O.:640]  Out: 800 [Urine:800]  06/07 1901 - 06/09 0700  In: 640 [P.O.:640]  Out: 800 [Urine:800]RESULTRCNT(24h)Principal Problem:    CVA (cerebral vascular accident) ([facility] (6/8/2018)    Active Problems:    Menstrual migraine (6/8/2018)      Stroke (cerebrum) ([facility] (6/8/2018)        Additional comments:I reviewed the patient's new clinical lab test results. and I reviewed the patients new imaging test results. General Exam  No acute distress, mucous membranes normal color and hydration status    Neurologic Exam    Mental status:  Alert, oriented to person, place, time and circumstance  Language: normal fluency and comprehension    Cranial nerves: PERRL, Extraocular movements intact and full, face symmetric to movement, Tongue midline with normal strength, palat symmetric    Motor: moves all 4 extremities symmetric. Assessment:     Mariza Dawson is a 46 y.o. woman who was admitted with acute onset left hemiparesis and hemiparesthesia involving the face, with involuntarily movements of the left corner of the face noted by the patient at the inset of the symptoms. The imaging were negative for intracranial process. Now she reported left face pain, in V2-V3 distribution mainly worse with clenching teeth, touching face which suggest a trigeminal neuralgia. Etiology could be postviral and can improve after a short course of oral steroids. Plan:   - ok to be discharge home today  - no need to continue ASA or statins  - outpatient prednisone , 60 mg PO qday x5 days.     - f/u with her PCM and if not better, than a longer course with Tegretol 100 mg BID (christo be increased to 200 mg BID) will be a good option      Signed:  Susanna Andrews MD  Adult Neurologist  6/9/2018  10:40 AM

## 2018-06-19 ENCOUNTER — OFFICE VISIT (OUTPATIENT)
Dept: FAMILY MEDICINE CLINIC | Facility: CLINIC | Age: 48
End: 2018-06-19

## 2018-06-19 VITALS
SYSTOLIC BLOOD PRESSURE: 100 MMHG | WEIGHT: 178 LBS | RESPIRATION RATE: 16 BRPM | DIASTOLIC BLOOD PRESSURE: 56 MMHG | TEMPERATURE: 98.3 F | HEIGHT: 63 IN | BODY MASS INDEX: 31.54 KG/M2 | OXYGEN SATURATION: 96 % | HEART RATE: 75 BPM

## 2018-06-19 DIAGNOSIS — E66.09 CLASS 1 OBESITY DUE TO EXCESS CALORIES WITHOUT SERIOUS COMORBIDITY WITH BODY MASS INDEX (BMI) OF 32.0 TO 32.9 IN ADULT: ICD-10-CM

## 2018-06-19 DIAGNOSIS — G45.1 HEMISPHERIC CAROTID ARTERY SYNDROME: Primary | ICD-10-CM

## 2018-06-19 RX ORDER — ATORVASTATIN CALCIUM 80 MG/1
80 TABLET, FILM COATED ORAL DAILY
Qty: 90 TAB | Refills: 3 | Status: SHIPPED | OUTPATIENT
Start: 2018-06-19

## 2018-06-19 NOTE — PROGRESS NOTES
Chief Complaint   Patient presents with    Neurologic Problem       Pt preferred language for health care discussion is english. Is someone accompanying this pt? no    Is the patient using any DME equipment during OV? no    Depression Screening:  PHQ over the last two weeks 6/19/2018   Little interest or pleasure in doing things Not at all   Feeling down, depressed or hopeless Not at all   Total Score PHQ 2 0       Learning Assessment:  Learning Assessment 6/19/2018   PRIMARY LEARNER Patient   PRIMARY LANGUAGE ENGLISH   LEARNER PREFERENCE PRIMARY READING   ANSWERED BY patient   RELATIONSHIP SELF       Health Maintenance reviewed and discussed per provider. Yes    Pt is due for   Health Maintenance Due   Topic Date Due    BREAST CANCER SCRN MAMMOGRAM  04/27/2018    FOBT Q 1 YEAR AGE 50-75  04/27/2018   . Please order/place referral if appropriate. Coordination of Care:  1. Have you been to the ER, urgent care clinic since your last visit? Hospitalized since your last visit? Yes,MV    2. Have you seen or consulted any other health care providers outside of the [facility] since your last visit? Include any pap smears or colon screening.  no

## 2018-06-19 NOTE — ADDENDUM NOTE
Addended by: Angie Raines on: 1/18/2017 05:20 PM     Modules accepted: Orders Reviewed discharge instructions and education with patient and family.  Pt verbalizes understanding and agrees with follow-up plan. Telemetry monitoring discontinued.  IV site discontinued. Catheter intact. No swelling, redness or pain noted.  No acute distress noted. Pt discharged via wheelchair with staff escort and family.

## 2018-06-19 NOTE — PROGRESS NOTES
HISTORY OF PRESENT ILLNESS  Maggy Contreras is a 46 y.o. female. HPI Comments: Seen in follow-up after a TIA involving the left arm and left side of face, which resolved completely during her hospitalization. She had entirely normal neck arteries and head CT/MRI. She has a Cardiology appointment on . She is taking Lipitor and baby aspirin, and has had no further symptoms. Neurologic Problem   Pertinent negatives include no focal weakness. Pertinent negatives include no shortness of breath, no chest pain, no vomiting, no headaches and no nausea. Past Medical History:   Diagnosis Date    Cardiac nuclear imaging test 2014    Low risk. No ischemia or prior infarction. EF 63%. No RWMA. Normal EKG on pharm stress test.    Cardiac transesophageal echocardiography (TASHA) 2014    EF 55%. Very sm R-L shunt suggestive of PFO. No LA appendage thrombus.  Cardiovascular LLE venous duplex 2015    Left leg:  No DVT. Past Surgical History:   Procedure Laterality Date    HX  SECTION  8578,2874       History   Smoking Status    Never Smoker   Smokeless Tobacco    Never Used     Current Outpatient Prescriptions   Medication Sig    atorvastatin (LIPITOR) 80 mg tablet Take 1 Tab by mouth daily.  aspirin delayed-release 81 mg tablet Take  by mouth daily. No current facility-administered medications for this visit. Review of Systems   Constitutional: Negative for chills and fever. Respiratory: Negative for shortness of breath. Cardiovascular: Negative for chest pain, palpitations and leg swelling. Gastrointestinal: Negative for nausea and vomiting. Neurological: Negative for tingling, focal weakness and headaches. Psychiatric/Behavioral: The patient does not have insomnia.       Visit Vitals    /56 (BP 1 Location: Right arm, BP Patient Position: Sitting)    Pulse 75    Temp 98.3 °F (36.8 °C) (Oral)    Resp 16    Ht 5' 3\" (1.6 m)    Wt 178 lb (80.7 kg)    SpO2 96%    BMI 31.53 kg/m2       Physical Exam   Constitutional: She is oriented to person, place, and time. She appears well-developed and well-nourished. No distress. Neck: Neck supple. No thyromegaly present. Carotid bruit absent   Cardiovascular: Normal rate, regular rhythm and intact distal pulses. Exam reveals no gallop and no friction rub. No murmur heard. Pulmonary/Chest: Effort normal and breath sounds normal. No respiratory distress. Musculoskeletal: She exhibits no edema. Lymphadenopathy:     She has no cervical adenopathy. Neurological: She is alert and oriented to person, place, and time. Skin: Skin is warm and dry. Psychiatric: She has a normal mood and affect. Her behavior is normal. Judgment and thought content normal.   Nursing note and vitals reviewed. ASSESSMENT and PLAN    ICD-10-CM ICD-9-CM    1. Hemispheric carotid artery syndrome G45.1 435.8 atorvastatin (LIPITOR) 80 mg tablet   2. Class 1 obesity due to excess calories without serious comorbidity with body mass index (BMI) of 32.0 to 32.9 in adult E66.09 278.00     Z68.32 V85.32      Follow-up Disposition:  Return in about 3 months (around 9/19/2018). current treatment plan is effective, no change in therapy - refill Lipitor. Advised to tell Cardiology about her episode at her upcoming appointment - she should probably have an echo to complete her TIA work-up.  reviewed medications and side effects in detail  radiology results and schedule of future radiology studies reviewed with patient  Plan of care reviewed - patient verbalize(s) understanding and agreement.

## 2018-06-19 NOTE — MR AVS SNAPSHOT
Genevirginie Griffin 
 
 
 [address] [facility] [address] 
[phone] Patient: Heather Recio MRN: [id number] :1970 Visit Information Date & Time Provider Department Dept. Phone Encounter #  
 2018  3:00 PM Sebastián Ordonez MD [facility] [phone] [id number] Follow-up Instructions Return in about 3 months (around 2018). Your Appointments 2018 10:00 AM  
Follow Up with Matilda Briones MD  
[facility] ([address] [facility]) Appt Note: yearly f/up with Dr. Kimberley Hendrickson; $75 copay/$0 balance//insurance only covers family planning; l/m to verify insurance coverage for up coming dos 18-alb; The patient has a distinctive accent and states she has Medicaid. She was informed to bring her insurance card with her.; I explained to the patient that she only is covered for family planning and that it would be 76. copay for the OV.; .  
 [address] [place] [address] [phone] [facility] [address] Upcoming Health Maintenance Date Due  
 BREAST CANCER SCRN MAMMOGRAM 2018 FOBT Q 1 YEAR AGE 50-75 2018 Influenza Age 5 to Adult 2018 PAP AKA CERVICAL CYTOLOGY 2020 DTaP/Tdap/Td series (2 - Td) 2026 Allergies as of 2018  Review Complete On: 2018 By: Sebastián Ordonez MD  
 No Known Allergies Current Immunizations  Reviewed on 3/2/2018 Name Date Influenza Vaccine (Quad) PF 2016 11:30 AM  
 Tdap 2016 11:30 AM  
  
 Not reviewed this visit You Were Diagnosed With   
  
 Codes Comments Hemispheric carotid artery syndrome    -  Primary ICD-10-CM: G45.1 ICD-9-CM: 435.8  Class 1 obesity due to excess calories without serious comorbidity with body mass index (BMI) of 32.0 to 32.9 in adult     ICD-10-CM: E66.09, O88.38 
 ICD-9-CM: 278.00, V85.32 Vitals BP Pulse Temp Resp Height(growth percentile) Weight(growth percentile) 100/56 (BP 1 Location: Right arm, BP Patient Position: Sitting) 75 98.3 °F (36.8 °C) (Oral) 16 5' 3\" (1.6 m) 178 lb (80.7 kg) SpO2 BMI OB Status Smoking Status 96% 31.53 kg/m2 Having regular periods Never Smoker Vitals History BMI and BSA Data Body Mass Index Body Surface Area  
 31.53 kg/m 2 1.89 m 2 Preferred Pharmacy Pharmacy Name Phone [address] [place] [address]. [phone] Your Updated Medication List  
  
   
This list is accurate as of 6/19/18  4:15 PM.  Always use your most recent med list.  
  
  
  
  
 aspirin delayed-release 81 mg tablet Take  by mouth daily. atorvastatin 80 mg tablet Commonly known as:  LIPITOR Take 1 Tab by mouth daily. Indications: prevention of transient ischemic attack Prescriptions Sent to Pharmacy Refills  
 atorvastatin (LIPITOR) 80 mg tablet 3 Sig: Take 1 Tab by mouth daily. Indications: prevention of transient ischemic attack Class: Normal  
 Pharmacy: [facility] [address]  #: [phone] Route: Oral  
  
Follow-up Instructions Return in about 3 months (around 9/19/2018). Introducing [facility]! Dear Ena Bishop: Thank you for requesting a iClinical account. Our records indicate that you already have an active iClinical account. You can access your account anytime at [email or web address] Did you know that you can access your hospital and ER discharge instructions at any time in iClinical? You can also review all of your test results from your hospital stay or ER visit. Additional Information If you have questions, please visit the Frequently Asked Questions section of the i[employer] website at [email or web address] Remember, MyChart is NOT to be used for urgent needs. For medical emergencies, dial 911. Now available from your iPhone and Android! Please provide this summary of care documentation to your next provider. Your primary care clinician is listed as Bonifacio Harper. If you have any questions after today's visit, please call [phone].

## 2018-06-28 ENCOUNTER — OFFICE VISIT (OUTPATIENT)
Dept: CARDIOLOGY CLINIC | Age: 48
End: 2018-06-28

## 2018-06-28 VITALS
SYSTOLIC BLOOD PRESSURE: 110 MMHG | DIASTOLIC BLOOD PRESSURE: 74 MMHG | BODY MASS INDEX: 32.78 KG/M2 | OXYGEN SATURATION: 90 % | WEIGHT: 185 LBS | HEIGHT: 63 IN | HEART RATE: 72 BPM

## 2018-06-28 DIAGNOSIS — Q21.12 PFO (PATENT FORAMEN OVALE): Primary | ICD-10-CM

## 2018-06-28 DIAGNOSIS — G45.9 TRANSIENT CEREBRAL ISCHEMIA, UNSPECIFIED TYPE: ICD-10-CM

## 2018-06-28 DIAGNOSIS — R07.9 CHEST PAIN, UNSPECIFIED TYPE: ICD-10-CM

## 2018-06-28 NOTE — MR AVS SNAPSHOT
[address] [place] [address] [place] [address] 
[phone] Patient: Aminta Schwartz MRN: [id number] :1970 Visit Information Date & Time Provider Department Dept. Phone Encounter #  
 2018 10:00 AM Gretchen Islas MD [facility] [address] [id number] Upcoming Health Maintenance Date Due  
 BREAST CANCER SCRN MAMMOGRAM 2018 FOBT Q 1 YEAR AGE 50-75 2018 Influenza Age 5 to Adult 2018 PAP AKA CERVICAL CYTOLOGY 2020 DTaP/Tdap/Td series (2 - Td) 2026 Allergies as of 2018  Review Complete On: 2018 By: Gretchen Islas MD  
 No Known Allergies Current Immunizations  Reviewed on 3/2/2018 Name Date Influenza Vaccine (Quad) PF 2016 11:30 AM  
 Tdap 2016 11:30 AM  
  
 Not reviewed this visit You Were Diagnosed With   
  
 Codes Comments PFO (patent foramen ovale)    -  Primary ICD-10-CM: Q21.1 ICD-9-CM: 255. 5 Chest pain, unspecified type     ICD-10-CM: R07.9 ICD-9-CM: 786.50 Transient cerebral ischemia, unspecified type     ICD-10-CM: G45.9 ICD-9-CM: 435.9 Vitals BP Pulse Height(growth percentile) Weight(growth percentile) SpO2 BMI  
 110/74 72 5' 3\" (1.6 m) 185 lb (83.9 kg) 90% 32.77 kg/m2 OB Status Smoking Status Having regular periods Never Smoker Vitals History BMI and BSA Data Body Mass Index Body Surface Area 32.77 kg/m 2 1.93 m 2 Preferred Pharmacy Pharmacy Name Phone [facility] [place] [facility] [address] [place] [address]. [phone] Your Updated Medication List  
  
   
This list is accurate as of 18 10:31 AM.  Always use your most recent med list.  
  
  
  
  
 aspirin delayed-release 81 mg tablet Take  by mouth daily. atorvastatin 80 mg tablet Commonly known as:  LIPITOR Take 1 Tab by mouth daily. Indications: prevention of transient ischemic attack Introducing [facility]! Dear Jhon Nuñez: Thank you for requesting a [employer] account. Our records indicate that you already have an active [employer] account. You can access your account anytime at [email or web address] Did you know that you can access your hospital and ER discharge instructions at any time in [employer]? You can also review all of your test results from your hospital stay or ER visit. Additional Information If you have questions, please visit the Frequently Asked Questions section of the [employer] website at [email or web address] Remember, [employer] is NOT to be used for urgent needs. For medical emergencies, dial 911. Now available from your iPhone and Android! Please provide this summary of care documentation to your next provider. Your primary care clinician is listed as Estela Garces. If you have any questions after today's visit, please call [phone].

## 2018-06-28 NOTE — PROGRESS NOTES
History of Present Illness:  A 46 y.o. female here for follow up. I saw her in the hospital a number of years ago when she had atypical chest pain. She also had a TIA and performed a TASHA. She had a very tiny PFO. She was having some atypical chest pain back in 2016. Once again, nuclear stress test was done. She had normal function. There was some soft tissue breast attenuation, but no obvious ischemia. She has not had any recurrent pain. She was seen in the emergency department 6/8/18 with some left facial paresthesias of unclear etiology. She denies any palpitations, dyspnea or syncope. It improved fairly quickly. Impression/Plan:   Recent left facial paresthesias of unclear etiology although seems to be in the distribution of left facial nerve. She was seen in the emergency department, it has resolved. History of recurrent atypical  chest pain with last stress test 2016 without ischemia. She had some soft tissue attenuation likely related to breast artifact. Degenerative joint disease with history of sacroiliac dysfunction. History of GERD. Remote TIA with small PFO by TASHA 2014. She has not had any recurrent chest pain. A stress test was discussed. She does have a known small PFO. She is taking a baby aspirin without recurrence of clinical TIA. All questions answered. She will continue to follow up with her primary care physician. I will be available if any further cardiac issues arise. Past Medical History:   Diagnosis Date    Cardiac nuclear imaging test 09/30/2014    Low risk. No ischemia or prior infarction. EF 63%. No RWMA. Normal EKG on pharm stress test.    Cardiac transesophageal echocardiography (TASHA) 09/30/2014    EF 55%. Very sm R-L shunt suggestive of PFO. No LA appendage thrombus.  Cardiovascular LLE venous duplex 07/14/2015    Left leg:  No DVT.        Current Outpatient Prescriptions   Medication Sig Dispense Refill    atorvastatin (LIPITOR) 80 mg tablet Take 1 Tab by mouth daily. Indications: prevention of transient ischemic attack 90 Tab 3    aspirin delayed-release 81 mg tablet Take  by mouth daily. Social History   reports that she has never smoked. She has never used smokeless tobacco.   reports that she does not drink alcohol. Family History  family history is not on file. Review of Systems  Except as stated above include:  Constitutional: Negative for fever, chills and malaise/fatigue. HEENT: No congestion or recent URI. Gastrointestinal: No nausea, vomiting, abdominal pain, bloody stools. Pulmonary:  Negative except as stated above. Cardiac:  Negative except as stated above. Musculoskeletal: Negative except as stated above. Neurological:  No localized symptoms. Skin:  Negative except as stated above. Psych:  Negative except as stated above. Endocrine:  Negative except as stated above. PHYSICAL EXAM  BP Readings from Last 3 Encounters:   06/28/18 110/74   06/19/18 100/56   06/09/18 99/64     Pulse Readings from Last 3 Encounters:   06/28/18 72   06/19/18 75   06/09/18 64     Wt Readings from Last 3 Encounters:   06/28/18 83.9 kg (185 lb)   06/19/18 80.7 kg (178 lb)   06/09/18 85 kg (187 lb 8 oz)     General:   Well developed, well groomed. Head/Neck:   No jugular venous distention     No carotid bruits. No evidence of xanthelasma. Lungs:   No respiratory distress. Clear bilaterally. Heart:    Regular rate and rhythm. Normal S1/S2. Palpation of heart with normal point of maximum impulse. No significant murmurs, rubs or gallops. Abdomen:   Soft and nontender. No palpable abdominal mass or bruits. Extremities:   Intact peripheral pulses. No significant edema. Neurological:   Alert and oriented to person, place, time. No focal neurological deficit visually.   Skin:   No obvious rash    Blood Pressure Metric:  Nitesh Rose has been given the following recommendations today due to her elevated BP reading: controlled

## 2018-06-28 NOTE — PROGRESS NOTES
1. Have you been to the ER, urgent care clinic since your last visit? Hospitalized since your last visit? Yes, 6/8/18 - 6/9/18 for left facial paraesthesia    2. Have you seen or consulted any other health care providers outside of the [address] [place] [address] since your last visit? Include any pap smears or colon screening.  No

## 2020-08-28 ENCOUNTER — HOSPITAL ENCOUNTER (OUTPATIENT)
Dept: GENERAL RADIOLOGY | Age: 50
Discharge: HOME | End: 2020-08-28
Payer: MEDICAID

## 2020-08-28 DIAGNOSIS — M25.511 RIGHT SHOULDER PAIN: ICD-10-CM

## 2020-08-28 PROCEDURE — 73030 X-RAY EXAM OF SHOULDER: CPT

## 2020-09-01 ENCOUNTER — APPOINTMENT (OUTPATIENT)
Dept: PHYSICAL THERAPY | Age: 50
End: 2020-09-01
Payer: MEDICAID

## 2020-09-09 ENCOUNTER — HOSPITAL ENCOUNTER (OUTPATIENT)
Dept: PHYSICAL THERAPY | Age: 50
Discharge: HOME | End: 2020-09-09
Payer: MEDICAID

## 2020-09-09 PROCEDURE — 97161 PT EVAL LOW COMPLEX 20 MIN: CPT

## 2020-09-09 NOTE — PROGRESS NOTES
[facility]  [facility]  [address] [facility]  [phone] fax    Plan of Care/ Statement of Necessity for Physical Therapy Services    Patient name: Roxie Lara Start of Care: 2020   Referral source: Yoselin Mishra MD : 1970    Medical Diagnosis: Right shoulder pain [M25.511]  Payor: [address] [place] [address] / Plan: [facility] / Product Type: Managed Care Medicaid /  Onset Date:1 month    Treatment Diagnosis: Right shoulder pain   Prior Hospitalization: see medical history Provider#: [id number]   Medications: Verified on Patient summary List    Comorbidities: NONE. 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 Assessment/ key information: Pt is a 44 yr old right hand dominant female with reports of insidious onset of right shoulder pain approx 1 month ago. Pt reports intermittent shoulder pain that is described as achy and sometimes grabbing in nature. 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 Pt denies any paresthesias. She demonstrates ROM to [facility], strength grossly 4/5. The pain extends into her arm and forearm just below the elbow. Drop Arm Test was negative. Sx are currently associated with bursitis. Pt does however have increased muscle activity and tightness to her UT, Levator Scapula, Rhomboids bilaterally, right >left. She presents with poor posture, including rounded shoulders with tight pecs and anterior rotated [address]. Pt will benefit from skilled therapy to restore shoulder function,strength and to improve QOL to be able to fully perform her job duties.       Evaluation Complexity History LOW Complexity : Zero comorbidities / personal factors that will impact the outcome / POC; Examination LOW Complexity : 1-2 Standardized tests and measures addressing body structure, function, activity limitation and / or participation in recreation  ;Presentation LOW Complexity : Stable, uncomplicated  ;Clinical Decision Making MEDIUM Complexity : FOTO score of 26-74  Overall Complexity Rating: LOW   Problem List: pain affecting function, decrease ROM, decrease strength, decrease ADL/ functional abilitiies and decrease activity tolerance   Treatment Plan may include any combination of the following: Therapeutic exercise, Therapeutic activities, Neuromuscular re-education, Physical agent/modality, Manual therapy, Patient education and Self Care training  Patient / Family readiness to learn indicated by: asking questions, trying to perform skills and interest  Persons(s) to be included in education: patient (P)  Barriers to Learning/Limitations: None  Patient Goal (s): full use of shoulder  Patient Self Reported Health Status: good  Rehabilitation Potential: good    Short Term Goals: To be accomplished in 1 weeks:   1. Pt will establish a HEP to improve shoulder function. Long Term Goals: To be accomplished in 5 weeks:   1. Pt will increase FOTO score by 8  pts to improve function. 2. 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 3. Pt will report pain <2/10 during work activities to ease with improving job duties. 4. Pt will report >70% improvement in sx to fully return to PLOF. Frequency / Duration: Patient to be seen 2 times per week for 5 weeks. Patient/ CarPatient/ Caregiver education and instruction: Diagnosis, prognosis, self care, activity modification and exercises   [x]  Plan of care has been reviewed with DIONI Lozoya, PT 9/9/2020 10:25 AM    ________________________________________________________________________    I certify that the above Therapy Services are being furnished while the patient is under my care. I agree with the treatment plan and certify that this therapy is necessary.     [facility] Signature:____________Date:_________TIME:________    [facility], Date and Time must be completed for valid certification **    Please sign and return to [facility]  Antwon Dawkins  [address] [place] [address]  [phone] fax

## 2020-09-09 NOTE — PROGRESS NOTES
PT DAILY TREATMENT NOTE - [facility]     Patient Name: Juan M Muro  Date:2020  : 1970  [x]  Patient  Verified  Payor: [address] / Plan:  [facility] / Product Type: Managed Care Medicaid /    In time:1210  Out time:1250  Total Treatment Time (min): 40  Visit #: 1 of 6-10    Treatment Area: Right shoulder pain [M25.511]    SUBJECTIVE  Pain Level (0-10 scale): 6/10  Any medication changes, allergies to medications, adverse drug reactions, diagnosis change, or new procedure performed?: [x] No    [] Yes (see summary sheet for update)  Subjective functional status/changes:   [] No changes reported  See eval    OBJECTIVE    Modality rationale: decrease pain to improve the patients ability to ease with adl's   Min Type Additional Details    [] Estim:  []Unatt       []IFC  []Premod                        []Other:  []w/ice   []w/heat  Position:  Location:    [] Estim: []Att    []TENS instruct  []NMES                    []Other:  []w/US   []w/ice   []w/heat  Position:  Location:    []  Traction: [] Cervical       []Lumbar                       [] Prone          []Supine                       []Intermittent   []Continuous Lbs:  [] before manual  [] after manual    []  Ultrasound: []Continuous   [] Pulsed                           []1MHz   []3MHz W/cm2:  Location:    []  Iontophoresis with dexamethasone         Location: [] Take home patch   [] In clinic   10 []  Ice     [x]  heat  []  Ice massage  []  Laser   []  Anodyne Position:seated  Location:bilat shoulders    []  Laser with stim  []  Other:  Position:  Location:    []  Vasopneumatic Device Pressure:       [] lo [] med [] hi   Temperature: [] lo [] med [] hi   [] Skin assessment post-treatment:  []intact []redness- no adverse reaction    []redness  adverse reaction:     30 min []Eval                  []Re-Eval             With   [] TE   [] TA   [] neuro   [] other: Patient Education: [x] Review HEP    [] Progressed/Changed HEP based on:   [] positioning   [] body mechanics   [] transfers   [] heat/ice application    [] other:      Other Objective/Functional Measures: see eval     Pain Level (0-10 scale) post treatment: 5/10    ASSESSMENT/Changes in Function: see poc    Patient will continue to benefit from skilled PT services to modify and progress therapeutic interventions, address functional mobility deficits, address ROM deficits, address strength deficits, analyze and address soft tissue restrictions, analyze and cue movement patterns, analyze and modify body mechanics/ergonomics, assess and modify postural abnormalities and address imbalance/dizziness to attain remaining goals.      [x]  See Plan of Care  []  See progress note/recertification  []  See Discharge Summary         Progress towards goals / Updated goals:  See poc    PLAN  [x]  Upgrade activities as tolerated     [x]  Continue plan of care  []  Update interventions per flow sheet       []  Discharge due to:_  []  Other:_      Kathie Johns, PT 9/9/2020  10:24 AM    Future Appointments   Date Time Provider [place]   9/9/2020 12:00 PM Tito Martinez, PT [id number]

## 2020-10-06 ENCOUNTER — APPOINTMENT (OUTPATIENT)
Dept: PHYSICAL THERAPY | Age: 50
End: 2020-10-06
Payer: MEDICAID

## 2020-10-13 ENCOUNTER — HOSPITAL ENCOUNTER (OUTPATIENT)
Dept: PHYSICAL THERAPY | Age: 50
Discharge: HOME | End: 2020-10-13
Payer: MEDICAID

## 2020-10-13 PROCEDURE — 97110 THERAPEUTIC EXERCISES: CPT

## 2020-10-13 PROCEDURE — 97035 APP MDLTY 1+ULTRASOUND EA 15: CPT

## 2020-10-13 PROCEDURE — 97112 NEUROMUSCULAR REEDUCATION: CPT

## 2020-10-13 NOTE — PROGRESS NOTES
PT DAILY TREATMENT NOTE - [facility]     Patient Name: Ren Palma  Date:10/13/2020  : 1970  [x]  Patient  Verified  Payor: [address] / Plan: [facility] / Product Type: Managed Care Medicaid /    In time:1203  Out time:1251  Total Treatment Time (min): 48  Visit #: 2 of 10    Treatment Area: Right shoulder pain [M25.511]    SUBJECTIVE  Pain Level (0-10 scale): 5/10  Any medication changes, allergies to medications, adverse drug reactions, diagnosis change, or new procedure performed?: [x] No    [] Yes (see summary sheet for update)  Subjective functional status/changes:   [] No changes reported  Reports she got an elliptical and has been doing better since working out on it.     OBJECTIVE    Modality rationale: decrease pain to improve the patients ability to ease with ADL's   Min Type Additional Details    [] Estim:  []Unatt       []IFC  []Premod                        []Other:  []w/ice   []w/heat  Position:  Location:    [] Estim: []Att    []TENS instruct  []NMES                    []Other:  []w/US   []w/ice   []w/heat  Position:  Location:    []  Traction: [] Cervical       []Lumbar                       [] Prone          []Supine                       []Intermittent   []Continuous Lbs:  [] before manual  [] after manual   7 [x]  Ultrasound: [x]Continuous   [] Pulsed                           [x]1MHz   []3MHz W/cm2:1.5  Location:right UT    []  Iontophoresis with dexamethasone         Location: [] Take home patch   [] In clinic   10 []  Ice     [x]  heat  []  Ice massage  []  Laser   []  Anodyne Position:seated  Location:right sh    []  Laser with stim  []  Other:  Position:  Location:    []  Vasopneumatic Device Pressure:       [] lo [] med [] hi   Temperature: [] lo [] med [] hi   [] Skin assessment post-treatment:  []intact []redness- no adverse reaction    []redness  adverse reaction:       16 min Therapeutic Exercise:  [] See flow sheet :   Rationale: increase ROM and increase strength to improve the patients ability to ease with ADL's    15 min Neuromuscular Re-education:  []  See flow sheet :scapula stabilizing ex's for performing job duties   Rationale: increase ROM, increase strength and improve coordination  to improve the patients ability 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 With   [] TE   [] TA   [] neuro   [] other: Patient Education: [x] Review HEP    [] Progressed/Changed HEP based on:   [] positioning   [] body mechanics   [] transfers   [] heat/ice application    [] other:      Other Objective/Functional Measures: initiated ex program. No increased pain    TTP to right UT with trigger point to upper fibers. 4min DTM to right UT. Pain Level (0-10 scale) post treatment: 4/10    ASSESSMENT/Changes in Function: Good progress. Pt doing HEP and she said the Elliptical machine is helping. She no longer has tingling into fingers. Patient will continue to benefit from skilled PT services to modify and progress therapeutic interventions, address functional mobility deficits, address ROM deficits, address strength deficits, analyze and address soft tissue restrictions, analyze and cue movement patterns, analyze and modify body mechanics/ergonomics and assess and modify postural abnormalities to attain remaining goals. [x]  See Plan of Care  []  See progress note/recertification  []  See Discharge Summary         Progress towards goals / Updated goals:      1. Pt will establish a HEP to improve shoulder function. MET. 10/13/20  Long Term Goals: To be accomplished in 5 weeks:               1. Pt will increase FOTO score by 8  pts to improve function. 2. 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 3. Pt will report pain <2/10 during work activities to ease with improving job duties.                 4. Pt will report >70% improvement in sx to fully return to PLOF. PLAN  []  Upgrade activities as tolerated     []  Continue plan of care  []  Update interventions per flow sheet       []  Discharge due to:_  []  Other:_      Flavio Lu, PT 10/13/2020  12:42 PM    No future appointments.

## 2020-12-03 NOTE — ANCILLARY DISCHARGE INSTRUCTIONS
[facility] Jaky Martine  [address] [facility]  [phone] fax    Physical Therapy Discharge Summary    Patient name: Sharif Bullard Start of Care: 2020   Referral source: Tej Norris MD : 1970                Medical Diagnosis: Right shoulder pain [M25.511]  Payor: [facility] MEDICAID / Plan: [address] [place] [address] / Product Type: Managed Care Medicaid /  Onset Date:1 month                Treatment Diagnosis: Right shoulder pain   Prior Hospitalization: see medical history Provider#: [id number]   Medications: Verified on Patient summary List    Comorbidities: NONE. 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 Pt doing HEP and she said the Elliptical machine is helping at home. She no longer has tingling into fingers. Pt DC'd. ASSESSMENT/RECOMMENDATIONS:  [x]Discontinue therapy: [x]Patient has reached or is progressing toward set goals      []Patient is non-compliant or has abdicated      []Due to lack of appreciable progress towards set goals    Ramses Cruz, PT 12/3/2020 9:15 AM    NOTE TO PHYSICIAN:  Please complete the following and fax to: [facility] at [facility] at [phone]  Retain this original for your records. If you are unable to process this request in   24 hours, please contact our office.      [] I have read the above report and request that my patient continue therapy with the following changes/special instructions:  [] I have read the above report and request that my patient be discharged from therapy    Physician's Signature:____________Date:_________TIME:________    ** Signature, Date and Time must be completed for valid certification **

## 2021-01-19 ENCOUNTER — HOSPITAL ENCOUNTER (EMERGENCY)
Age: 51
Discharge: HOME | End: 2021-01-19
Attending: EMERGENCY MEDICINE
Payer: MEDICAID

## 2021-01-19 ENCOUNTER — HOSPITAL ENCOUNTER (EMERGENCY)
Age: 51
Discharge: STILL ADMITTED OR ADMINISTRATIVE | End: 2021-01-19

## 2021-01-19 ENCOUNTER — APPOINTMENT (OUTPATIENT)
Dept: MRI IMAGING | Age: 51
End: 2021-01-19
Attending: STUDENT IN AN ORGANIZED HEALTH CARE EDUCATION/TRAINING PROGRAM
Payer: MEDICAID

## 2021-01-19 ENCOUNTER — APPOINTMENT (OUTPATIENT)
Dept: CT IMAGING | Age: 51
End: 2021-01-19
Attending: PHYSICIAN ASSISTANT
Payer: MEDICAID

## 2021-01-19 VITALS
SYSTOLIC BLOOD PRESSURE: 131 MMHG | BODY MASS INDEX: 32.6 KG/M2 | TEMPERATURE: 98.9 F | OXYGEN SATURATION: 100 % | WEIGHT: 184 LBS | HEIGHT: 63 IN | HEART RATE: 60 BPM | RESPIRATION RATE: 16 BRPM | DIASTOLIC BLOOD PRESSURE: 77 MMHG

## 2021-01-19 DIAGNOSIS — R20.0 RIGHT SIDED NUMBNESS: Primary | ICD-10-CM

## 2021-01-19 DIAGNOSIS — K08.89 TOOTH PAIN: ICD-10-CM

## 2021-01-19 LAB
ANION GAP SERPL CALC-SCNC: 3 MMOL/L (ref 3–18)
APTT PPP: 30.4 SEC (ref 23–36.4)
BASOPHILS # BLD: 0 K/UL (ref 0–0.1)
BASOPHILS NFR BLD: 0 % (ref 0–2)
BUN SERPL-MCNC: 10 MG/DL (ref 7–18)
BUN/CREAT SERPL: 14 (ref 12–20)
CALCIUM SERPL-MCNC: 9.7 MG/DL (ref 8.5–10.1)
CHLORIDE SERPL-SCNC: 111 MMOL/L (ref 100–111)
CHOLEST SERPL-MCNC: 188 MG/DL
CK MB CFR SERPL CALC: 1 % (ref 0–4)
CK MB SERPL-MCNC: 1.8 NG/ML (ref 5–25)
CK SERPL-CCNC: 188 U/L (ref 26–192)
CO2 SERPL-SCNC: 30 MMOL/L (ref 21–32)
CREAT SERPL-MCNC: 0.69 MG/DL (ref 0.6–1.3)
DIFFERENTIAL METHOD BLD: ABNORMAL
EOSINOPHIL # BLD: 0 K/UL (ref 0–0.4)
EOSINOPHIL NFR BLD: 1 % (ref 0–5)
ERYTHROCYTE [DISTWIDTH] IN BLOOD BY AUTOMATED COUNT: 12.2 % (ref 11.6–14.5)
EST. AVERAGE GLUCOSE BLD GHB EST-MCNC: 91 MG/DL
GLUCOSE BLD STRIP.AUTO-MCNC: 71 MG/DL (ref 70–110)
GLUCOSE SERPL-MCNC: 81 MG/DL (ref 74–99)
HBA1C MFR BLD: 4.8 % (ref 4.2–5.6)
HCT VFR BLD AUTO: 34.9 % (ref 35–45)
HDLC SERPL-MCNC: 73 MG/DL (ref 40–60)
HDLC SERPL: 2.6 {RATIO} (ref 0–5)
HGB BLD-MCNC: 11.4 G/DL (ref 12–16)
INR PPP: 1.1 (ref 0.8–1.2)
LDLC SERPL CALC-MCNC: 106.4 MG/DL (ref 0–100)
LIPID PROFILE,FLP: ABNORMAL
LYMPHOCYTES # BLD: 1.4 K/UL (ref 0.9–3.6)
LYMPHOCYTES NFR BLD: 47 % (ref 21–52)
MCH RBC QN AUTO: 31.1 PG (ref 24–34)
MCHC RBC AUTO-ENTMCNC: 32.7 G/DL (ref 31–37)
MCV RBC AUTO: 95.4 FL (ref 74–97)
MONOCYTES # BLD: 0.2 K/UL (ref 0.05–1.2)
MONOCYTES NFR BLD: 6 % (ref 3–10)
NEUTS SEG # BLD: 1.4 K/UL (ref 1.8–8)
NEUTS SEG NFR BLD: 46 % (ref 40–73)
PLATELET # BLD AUTO: 202 K/UL (ref 135–420)
PMV BLD AUTO: 11.4 FL (ref 9.2–11.8)
POTASSIUM SERPL-SCNC: 3.7 MMOL/L (ref 3.5–5.5)
PROTHROMBIN TIME: 13.9 SEC (ref 11.5–15.2)
RBC # BLD AUTO: 3.66 M/UL (ref 4.2–5.3)
SODIUM SERPL-SCNC: 144 MMOL/L (ref 136–145)
TRIGL SERPL-MCNC: 43 MG/DL (ref ?–150)
TROPONIN I SERPL-MCNC: <0.02 NG/ML (ref 0–0.04)
VLDLC SERPL CALC-MCNC: 8.6 MG/DL
WBC # BLD AUTO: 3.1 K/UL (ref 4.6–13.2)

## 2021-01-19 PROCEDURE — 74011250637 HC RX REV CODE- 250/637: Performed by: EMERGENCY MEDICINE

## 2021-01-19 PROCEDURE — A9577 INJ MULTIHANCE: HCPCS | Performed by: EMERGENCY MEDICINE

## 2021-01-19 PROCEDURE — 82962 GLUCOSE BLOOD TEST: CPT

## 2021-01-19 PROCEDURE — 70496 CT ANGIOGRAPHY HEAD: CPT

## 2021-01-19 PROCEDURE — 96374 THER/PROPH/DIAG INJ IV PUSH: CPT

## 2021-01-19 PROCEDURE — 85610 PROTHROMBIN TIME: CPT

## 2021-01-19 PROCEDURE — 82553 CREATINE MB FRACTION: CPT

## 2021-01-19 PROCEDURE — 70450 CT HEAD/BRAIN W/O DYE: CPT

## 2021-01-19 PROCEDURE — 80061 LIPID PANEL: CPT

## 2021-01-19 PROCEDURE — 93005 ELECTROCARDIOGRAM TRACING: CPT

## 2021-01-19 PROCEDURE — 85025 COMPLETE CBC W/AUTO DIFF WBC: CPT

## 2021-01-19 PROCEDURE — 96376 TX/PRO/DX INJ SAME DRUG ADON: CPT

## 2021-01-19 PROCEDURE — 85730 THROMBOPLASTIN TIME PARTIAL: CPT

## 2021-01-19 PROCEDURE — 80048 BASIC METABOLIC PNL TOTAL CA: CPT

## 2021-01-19 PROCEDURE — 96361 HYDRATE IV INFUSION ADD-ON: CPT

## 2021-01-19 PROCEDURE — 83036 HEMOGLOBIN GLYCOSYLATED A1C: CPT

## 2021-01-19 PROCEDURE — A9575 INJ GADOTERATE MEGLUMI 0.1ML: HCPCS | Performed by: EMERGENCY MEDICINE

## 2021-01-19 PROCEDURE — 70553 MRI BRAIN STEM W/O & W/DYE: CPT

## 2021-01-19 PROCEDURE — 99283 EMERGENCY DEPT VISIT LOW MDM: CPT

## 2021-01-19 PROCEDURE — 74011000636 HC RX REV CODE- 636: Performed by: EMERGENCY MEDICINE

## 2021-01-19 PROCEDURE — 74011250636 HC RX REV CODE- 250/636: Performed by: EMERGENCY MEDICINE

## 2021-01-19 RX ORDER — GADOTERATE MEGLUMINE 376.9 MG/ML
15 INJECTION INTRAVENOUS
Status: COMPLETED | OUTPATIENT
Start: 2021-01-19 | End: 2021-01-19

## 2021-01-19 RX ORDER — ASPIRIN 325 MG
325 TABLET ORAL
Status: COMPLETED | OUTPATIENT
Start: 2021-01-19 | End: 2021-01-19

## 2021-01-19 RX ORDER — AMOXICILLIN AND CLAVULANATE POTASSIUM 875; 125 MG/1; MG/1
1 TABLET, FILM COATED ORAL 2 TIMES DAILY
Qty: 14 TAB | Refills: 0 | Status: SHIPPED | OUTPATIENT
Start: 2021-01-19 | End: 2021-01-26

## 2021-01-19 RX ORDER — SODIUM CHLORIDE 9 MG/ML
100 INJECTION, SOLUTION INTRAVENOUS CONTINUOUS
Status: DISCONTINUED | OUTPATIENT
Start: 2021-01-19 | End: 2021-01-19

## 2021-01-19 RX ADMIN — GADOTERATE MEGLUMINE 15 ML: 376.9 INJECTION INTRAVENOUS at 15:10

## 2021-01-19 RX ADMIN — ASPIRIN 325 MG ORAL TABLET 325 MG: 325 PILL ORAL at 15:43

## 2021-01-19 RX ADMIN — IOPAMIDOL 100 ML: 755 INJECTION, SOLUTION INTRAVENOUS at 13:51

## 2021-01-19 RX ADMIN — SODIUM CHLORIDE 1000 ML: 900 INJECTION, SOLUTION INTRAVENOUS at 15:43

## 2021-01-19 RX ADMIN — GADOBENATE DIMEGLUMINE 14 ML: 529 INJECTION, SOLUTION INTRAVENOUS at 15:29

## 2021-01-19 NOTE — ED NOTES
I performed a brief evaluation, including history and physical, of the patient here in triage and I have determined that pt will need further treatment and evaluation from the main side ER physician.  I have placed initial orders to help in expediting patients care.     January 19, 2021 at 12:25 PM - ISABELA Delaney      Tingling to right side of face, arm and right leg that began at 0800 this am. Sx are constant. Denies decreased sensation or weakness. On exam sensation equal and intact to extremities and face b/l. Strength 5/5 to extremities b/l. Dr Adair evaluated pt in triage. He recommended head CT, basic labs and tele-neuro consult. Denies taking blood thinners. Denies previous blood clot. Previous hx stroke. 2018.     Visit Vitals  /77   Pulse 60   Temp 98.9 °F (37.2 °C)   Resp 16   Ht 5' 3\" (1.6 m)   Wt 83.5 kg (184 lb)   SpO2 100%   BMI 32.59 kg/m²

## 2021-01-19 NOTE — ED PROVIDER NOTES
EMERGENCY DEPARTMENT HISTORY AND PHYSICAL EXAM    1:08 PM    Date: 2021  Patient Name: Teo Michel    History of Presenting Illness     Chief Complaint   Patient presents with    Facial Pain       History Provided By: Patient  Location/Duration/Severity/Modifying factors   HPI     Pt 43 yo female hx migraine presenting with right sided heaviness since 8 AM today   Pt states that she went to bed at 11 pm yesterday and she had no symptoms  The following day she woke up at 8 AM with right sided numbness and tingling  She states that previous day she had right sided tooth ache that has since resolved  She also states that her right eye feels \"tight\" however she is not able to express clearing what she means by tight. She denies any vision blindness, black spots or decreased vision in R eye    She denies any headache, fever, chills, chest pain    PCP: Leona Haney MD    Current Outpatient Medications   Medication Sig Dispense Refill    amoxicillin-clavulanate (Augmentin) 875-125 mg per tablet Take 1 Tab by mouth two (2) times a day for 7 days. 14 Tab 0    atorvastatin (LIPITOR) 80 mg tablet Take 1 Tab by mouth daily. Indications: prevention of transient ischemic attack 90 Tab 3    aspirin delayed-release 81 mg tablet Take  by mouth daily. Past History     Past Medical History:  Past Medical History:   Diagnosis Date    Cardiac nuclear imaging test 2014    Low risk. No ischemia or prior infarction. EF 63%. No RWMA. Normal EKG on pharm stress test.    Cardiac transesophageal echocardiography (TASHA) 2014    EF 55%. Very sm R-L shunt suggestive of PFO. No LA appendage thrombus.  Cardiovascular LLE venous duplex 2015    Left leg:  No DVT. Past Surgical History:  Past Surgical History:   Procedure Laterality Date    HX  SECTION  ,       Family History:  No family history on file.     Social History:  Social History     Tobacco Use    Smoking status: Never Smoker    Smokeless tobacco: Never Used   Substance Use Topics    Alcohol use: No    Drug use: No       Allergies:  No Known Allergies      Review of Systems     Review of Systems    Constitutional: denies fatigue, weight change, fever, chills, fatigue  eyes-denies  pain, discharge, blindness  ENT- denies sore throat, nasal congestion  Cardiac- denies palpitation, CP, dizziness, bilateral lower extremity edema, PND, syncope  Respiratory- denies cough, sputum, sob, wheezing  GI- denies bleeding, dysphagia, odynophagia, constipation, diarrhea, nausea, emesis, bleeding  :  Denies dysuria, and hematuria  HEME:denies bleeding  M/S: denies arthralgias, back pain, or myalgias  Neuro: denies weakness, numbness, tingling, or  tremor  All other ROS negative      Physical Exam     Visit Vitals  /77   Pulse 60   Temp 98.9 °F (37.2 °C)   Resp 16   Ht 5' 3\" (1.6 m)   Wt 83.5 kg (184 lb)   LMP 03/23/2017 (Approximate)   SpO2 100%   BMI 32.59 kg/m²       Physical Exam       GENERAL APPEARANCE: No acute distress. MENTAL: Well developed, well groomed. Appears stated age. Affect appropriate. Responds to questions appropriately. HEAD: Normocephalic. Atraumatic. Oral; oral mucosa moist, did not see abscess in mouth, no swelling or abnormal redness  EYES: conjunctiva and sclera appear normal    NOSE: Septum midline, no lesions. OROPHARYNX: Moist w/o exudate, erythema, or swelling. NECK: Symmetric, trachea midline. CHEST: . Breath sounds clear bilaterally w/o wheezes, rubs, rales, or rhonchi. CV: Normal S1 and S2 w/o murmur, rub, gallop, or click  ABDOMEN: Abdomen soft. BS+. No guarding or rebound. No palpable masses or tenderness. MS: normal muscle tone and strength for age, w/o atrophy or abnormal movement. EXTREMITIES:No edema, clubbing, or cyanosis    NEUROLOGICAL: Alert and oriented X 3 , 5/5 strength upper and lower extremity. Normal gait. Cranial nerves III-XII intact. Sensation intact.  Observed dexterity w/o ataxia or tremor. No pronator drift  SKIN: No grossly apparent lesions, masses, rashes, or ulcerations. Diagnostic Study Results     Labs -  Recent Results (from the past 12 hour(s))   GLUCOSE, POC    Collection Time: 01/19/21 12:32 PM   Result Value Ref Range    Glucose (POC) 71 70 - 110 mg/dL   CBC WITH AUTOMATED DIFF    Collection Time: 01/19/21 12:33 PM   Result Value Ref Range    WBC 3.1 (L) 4.6 - 13.2 K/uL    RBC 3.66 (L) 4.20 - 5.30 M/uL    HGB 11.4 (L) 12.0 - 16.0 g/dL    HCT 34.9 (L) 35.0 - 45.0 %    MCV 95.4 74.0 - 97.0 FL    MCH 31.1 24.0 - 34.0 PG    MCHC 32.7 31.0 - 37.0 g/dL    RDW 12.2 11.6 - 14.5 %    PLATELET 515 349 - 276 K/uL    MPV 11.4 9.2 - 11.8 FL    NEUTROPHILS 46 40 - 73 %    LYMPHOCYTES 47 21 - 52 %    MONOCYTES 6 3 - 10 %    EOSINOPHILS 1 0 - 5 %    BASOPHILS 0 0 - 2 %    ABS. NEUTROPHILS 1.4 (L) 1.8 - 8.0 K/UL    ABS. LYMPHOCYTES 1.4 0.9 - 3.6 K/UL    ABS. MONOCYTES 0.2 0.05 - 1.2 K/UL    ABS. EOSINOPHILS 0.0 0.0 - 0.4 K/UL    ABS.  BASOPHILS 0.0 0.0 - 0.1 K/UL    DF AUTOMATED     METABOLIC PANEL, BASIC    Collection Time: 01/19/21 12:33 PM   Result Value Ref Range    Sodium 144 136 - 145 mmol/L    Potassium 3.7 3.5 - 5.5 mmol/L    Chloride 111 100 - 111 mmol/L    CO2 30 21 - 32 mmol/L    Anion gap 3 3.0 - 18 mmol/L    Glucose 81 74 - 99 mg/dL    BUN 10 7.0 - 18 MG/DL    Creatinine 0.69 0.6 - 1.3 MG/DL    BUN/Creatinine ratio 14 12 - 20      GFR est AA >60 >60 ml/min/1.73m2    GFR est non-AA >60 >60 ml/min/1.73m2    Calcium 9.7 8.5 - 10.1 MG/DL   PROTHROMBIN TIME + INR    Collection Time: 01/19/21 12:33 PM   Result Value Ref Range    Prothrombin time 13.9 11.5 - 15.2 sec    INR 1.1 0.8 - 1.2     PTT    Collection Time: 01/19/21 12:33 PM   Result Value Ref Range    aPTT 30.4 23.0 - 36.4 SEC   CARDIAC PANEL,(CK, CKMB & TROPONIN)    Collection Time: 01/19/21 12:33 PM   Result Value Ref Range    CK - MB 1.8 <3.6 ng/ml    CK-MB Index 1.0 0.0 - 4.0 %     26 - 192 U/L Troponin-I, QT <0.02 0.0 - 0.045 NG/ML   LIPID PANEL    Collection Time: 01/19/21 12:33 PM   Result Value Ref Range    LIPID PROFILE          Cholesterol, total 188 <200 MG/DL    Triglyceride 43 <150 MG/DL    HDL Cholesterol 73 (H) 40 - 60 MG/DL    LDL, calculated 106.4 (H) 0 - 100 MG/DL    VLDL, calculated 8.6 MG/DL    CHOL/HDL Ratio 2.6 0 - 5.0     HEMOGLOBIN A1C WITH EAG    Collection Time: 01/19/21 12:33 PM   Result Value Ref Range    Hemoglobin A1c 4.8 4.2 - 5.6 %    Est. average glucose 91 mg/dL   EKG, 12 LEAD, INITIAL    Collection Time: 01/19/21  3:53 PM   Result Value Ref Range    Ventricular Rate 56 BPM    Atrial Rate 56 BPM    P-R Interval 148 ms    QRS Duration 68 ms    Q-T Interval 392 ms    QTC Calculation (Bezet) 378 ms    Calculated P Axis 55 degrees    Calculated R Axis 56 degrees    Calculated T Axis -21 degrees    Diagnosis       Sinus bradycardia  Nonspecific T wave abnormality  Abnormal ECG  When compared with ECG of 08-JUN-2018 12:51,  No significant change was found         Radiologic Studies -   MRI BRAIN W WO CONT   Final Result      1. Some focal mild maxillary sinusitis right side only remainder the   intracranial study is unremarkable no change from June 2018 identified       Today's study includes both pre and postcontrast sequences                  CT HEAD WO CONT   Final Result   :      1. Question significance of the density in the right lower ruba region of the   brainstem as described above. Probably artifact      CTA has been ordered which will re-evaluate this area including good detail of   the arterial anatomy and should be able to exclude any bleed or aneurysmal   changes. CTA HEAD NECK W WO CONT   Final Result      No large vessel occlusion. Extracranial cerebral arteries are patent and without significant stenosis.   -Poor visualization of the V1 RVA. Intracranial cerebral arteries are patent and without evidence of a high-grade   stenosis.    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 The queried right pontine hyperdensity is not seen on this exam and was very   likely artifactual.             Medical Decision Making   I am the first provider for this patient. I reviewed the vital signs, available nursing notes, past medical history, past surgical history, family history and social history. Vital Signs-Reviewed the patient's vital signs. EKG: Sinus bradycardia  when compared to prior EKG no change    Records Reviewed: Previous Laboratory Studies (Time of Review: 1:08 PM)    ED Course: Progress Notes, Reevaluation, and Consults:         Provider Notes (Medical Decision Making): MDM       Pt here with Right sided heaviness  Hemodynamically stable  Neuro exam normal,  And sensation intact  ddx migraine,  vs stroke  Labs as interpreted by me  -CBC and BMP within normal limits  -A1C normal, LDL slightly elevated otherwise Lipid panel normal    -CTA head and neck vessels patent no aneurysm  -CT head no acute hemorrhage  -Pt received asa 325 mg and normal saline fluid  -MRI head showed no stroke  -Discussed patient with Dr. Rigo Corona Neuro. Dr. Kayla briceñoay if patient follow up outpatient if all tests are negative  -Discussed with patient about MRI head and CTA head/neck results. Pt stated that she would like to go home. Pt did not want to be admitted. Advised patient to have close follow up with PCP. Also advised to continue home asa and lipitor  - Provided 7 day augment for tooth pain. Patient was instructed to see dentist asap     ATTENDING ATTESTATION:  This note is written by Manny Almaguer ([email or web address]) and edited by me. I personally saw and examined the patient. I have reviewed and agree with the residents findings, including all diagnostic interpretations, and plans as written. I was present during the key portions of separately billed procedures.   Mariama Olivares, MD      Procedures    Critical Care Time:    Diagnosis     Clinical Impression:   1. Right sided numbness    2. Tooth pain        Disposition: home    Follow-up Information     Follow up With Specialties Details Why Contact Info    Amaris Mcarthur MD Family Medicine Schedule an appointment as soon as possible for a visit in 3 days  [address] [facility]  [phone]             Patient's Medications   Start Taking    AMOXICILLIN-CLAVULANATE (AUGMENTIN) 875-125 MG PER TABLET    Take 1 Tab by mouth two (2) times a day for 7 days. Continue Taking    ASPIRIN DELAYED-RELEASE 81 MG TABLET    Take  by mouth daily. ATORVASTATIN (LIPITOR) 80 MG TABLET    Take 1 Tab by mouth daily.  Indications: prevention of transient ischemic attack   These Medications have changed    No medications on file   Stop Taking    No medications on file       [facility] PGY-1   [address]   Senior Pager: [phone]   January 19, 2021, 1:08 PM

## 2021-01-20 LAB
ATRIAL RATE: 56 BPM
CALCULATED P AXIS, ECG09: 55 DEGREES
CALCULATED R AXIS, ECG10: 56 DEGREES
CALCULATED T AXIS, ECG11: -21 DEGREES
DIAGNOSIS, 93000: NORMAL
P-R INTERVAL, ECG05: 148 MS
Q-T INTERVAL, ECG07: 392 MS
QRS DURATION, ECG06: 68 MS
QTC CALCULATION (BEZET), ECG08: 378 MS
VENTRICULAR RATE, ECG03: 56 BPM

## 2021-01-22 ENCOUNTER — HOSPITAL ENCOUNTER (EMERGENCY)
Age: 51
Discharge: HOME | End: 2021-01-22
Attending: STUDENT IN AN ORGANIZED HEALTH CARE EDUCATION/TRAINING PROGRAM
Payer: MEDICAID

## 2021-01-22 ENCOUNTER — APPOINTMENT (OUTPATIENT)
Dept: CT IMAGING | Age: 51
End: 2021-01-22
Attending: EMERGENCY MEDICINE
Payer: MEDICAID

## 2021-01-22 VITALS
TEMPERATURE: 98.6 F | SYSTOLIC BLOOD PRESSURE: 154 MMHG | DIASTOLIC BLOOD PRESSURE: 86 MMHG | RESPIRATION RATE: 16 BRPM | HEART RATE: 67 BPM | OXYGEN SATURATION: 100 %

## 2021-01-22 DIAGNOSIS — K08.89 DENTALGIA: Primary | ICD-10-CM

## 2021-01-22 LAB
ALBUMIN SERPL-MCNC: 4 G/DL (ref 3.4–5)
ALBUMIN/GLOB SERPL: 1 {RATIO} (ref 0.8–1.7)
ALP SERPL-CCNC: 102 U/L (ref 45–117)
ALT SERPL-CCNC: 32 U/L (ref 13–56)
ANION GAP SERPL CALC-SCNC: 6 MMOL/L (ref 3–18)
AST SERPL-CCNC: 27 U/L (ref 10–38)
BASOPHILS # BLD: 0 K/UL (ref 0–0.1)
BASOPHILS NFR BLD: 0 % (ref 0–2)
BILIRUB SERPL-MCNC: 0.4 MG/DL (ref 0.2–1)
BUN SERPL-MCNC: 12 MG/DL (ref 7–18)
BUN/CREAT SERPL: 18 (ref 12–20)
CALCIUM SERPL-MCNC: 10.3 MG/DL (ref 8.5–10.1)
CHLORIDE SERPL-SCNC: 108 MMOL/L (ref 100–111)
CO2 SERPL-SCNC: 30 MMOL/L (ref 21–32)
CREAT SERPL-MCNC: 0.65 MG/DL (ref 0.6–1.3)
DEPRECATED S PYO AG THROAT QL EIA: NEGATIVE
DIFFERENTIAL METHOD BLD: ABNORMAL
EOSINOPHIL # BLD: 0.1 K/UL (ref 0–0.4)
EOSINOPHIL NFR BLD: 2 % (ref 0–5)
ERYTHROCYTE [DISTWIDTH] IN BLOOD BY AUTOMATED COUNT: 12.5 % (ref 11.6–14.5)
GLOBULIN SER CALC-MCNC: 3.9 G/DL (ref 2–4)
GLUCOSE SERPL-MCNC: 73 MG/DL (ref 74–99)
HCT VFR BLD AUTO: 35 % (ref 35–45)
HGB BLD-MCNC: 11.5 G/DL (ref 12–16)
LACTATE BLD-SCNC: 0.57 MMOL/L (ref 0.4–2)
LYMPHOCYTES # BLD: 1.8 K/UL (ref 0.9–3.6)
LYMPHOCYTES NFR BLD: 38 % (ref 21–52)
MCH RBC QN AUTO: 31.8 PG (ref 24–34)
MCHC RBC AUTO-ENTMCNC: 32.9 G/DL (ref 31–37)
MCV RBC AUTO: 96.7 FL (ref 74–97)
MONOCYTES # BLD: 0.3 K/UL (ref 0.05–1.2)
MONOCYTES NFR BLD: 6 % (ref 3–10)
NEUTS SEG # BLD: 2.6 K/UL (ref 1.8–8)
NEUTS SEG NFR BLD: 54 % (ref 40–73)
PLATELET # BLD AUTO: 187 K/UL (ref 135–420)
PMV BLD AUTO: 11.6 FL (ref 9.2–11.8)
POTASSIUM SERPL-SCNC: 3.9 MMOL/L (ref 3.5–5.5)
PROT SERPL-MCNC: 7.9 G/DL (ref 6.4–8.2)
RBC # BLD AUTO: 3.62 M/UL (ref 4.2–5.3)
SODIUM SERPL-SCNC: 144 MMOL/L (ref 136–145)
WBC # BLD AUTO: 4.8 K/UL (ref 4.6–13.2)

## 2021-01-22 PROCEDURE — 87070 CULTURE OTHR SPECIMN AEROBIC: CPT

## 2021-01-22 PROCEDURE — 85025 COMPLETE CBC W/AUTO DIFF WBC: CPT

## 2021-01-22 PROCEDURE — 74011000636 HC RX REV CODE- 636: Performed by: STUDENT IN AN ORGANIZED HEALTH CARE EDUCATION/TRAINING PROGRAM

## 2021-01-22 PROCEDURE — 87040 BLOOD CULTURE FOR BACTERIA: CPT

## 2021-01-22 PROCEDURE — 80053 COMPREHEN METABOLIC PANEL: CPT

## 2021-01-22 PROCEDURE — 87880 STREP A ASSAY W/OPTIC: CPT

## 2021-01-22 PROCEDURE — 83605 ASSAY OF LACTIC ACID: CPT

## 2021-01-22 PROCEDURE — 99282 EMERGENCY DEPT VISIT SF MDM: CPT

## 2021-01-22 PROCEDURE — 70491 CT SOFT TISSUE NECK W/DYE: CPT

## 2021-01-22 RX ADMIN — IOPAMIDOL 80 ML: 612 INJECTION, SOLUTION INTRAVENOUS at 19:25

## 2021-01-22 NOTE — ED PROVIDER NOTES
EMERGENCY DEPARTMENT HISTORY AND PHYSICAL EXAM    Date: 1/22/2021  Patient Name: Isaias Mahmood    History of Presenting Illness     Chief Complaint   Patient presents with    Skin Problem     sander tonsilar         History Provided By: Patient  Additional History (Context): Isaias Mahmood is a 44 y.o. female with hyperlipidemia who presents with planes of persistent right-sided sore throat discomfort. She physic is getting enlarged despite being on Augmentin from the emergency department prescribed 4 days ago. Went to see her PCP who recommended she come to the emergency department now for imaging. Denies fever drooling; +pain w/opening mouth. Confirms is menopausal.    PCP: Mynor Lewis MD    Current Facility-Administered Medications   Medication Dose Route Frequency Provider Last Rate Last Admin    sodium chloride 0.9 % bolus infusion 1,000 mL  1,000 mL IntraVENous ONCE Malinda Farmer PA        clindamycin phosphate (CLEOCIN) 900 mg in 0.9% sodium chloride (MBP/ADV) 100 mL MBP  900 mg IntraVENous NOW Malinda Farmer PA         Current Outpatient Medications   Medication Sig Dispense Refill    amoxicillin-clavulanate (Augmentin) 875-125 mg per tablet Take 1 Tab by mouth two (2) times a day for 7 days. 14 Tab 0    atorvastatin (LIPITOR) 80 mg tablet Take 1 Tab by mouth daily. Indications: prevention of transient ischemic attack 90 Tab 3    aspirin delayed-release 81 mg tablet Take  by mouth daily. Past History     Past Medical History:  Past Medical History:   Diagnosis Date    Cardiac nuclear imaging test 09/30/2014    Low risk. No ischemia or prior infarction. EF 63%. No RWMA. Normal EKG on pharm stress test.    Cardiac transesophageal echocardiography (TASHA) 09/30/2014    EF 55%. Very sm R-L shunt suggestive of PFO. No LA appendage thrombus.  Cardiovascular LLE venous duplex 07/14/2015    Left leg:  No DVT.        Past Surgical History:  Past Surgical History:   Procedure Laterality Date    HX  SECTION  9024,9136       Family History:  No family history on file. Social History:  Social History     Tobacco Use    Smoking status: Never Smoker    Smokeless tobacco: Never Used   Substance Use Topics    Alcohol use: No    Drug use: No       Allergies:  No Known Allergies      Review of Systems   Review of Systems   Constitutional: Negative for fever. HENT: Positive for facial swelling. Negative for drooling. All Other Systems Negative  Physical Exam     Vitals:    21 1555   BP: (!) 154/86   Pulse: 67   Resp: 16   Temp: 98.6 °F (37 °C)   SpO2: 100%     Physical Exam  Vitals signs and nursing note reviewed. Constitutional:       Appearance: She is well-developed. HENT:      Head: Normocephalic and atraumatic. Mouth/Throat:      Comments: Not entirely clear visualization of the pharynx but appears that she may have right tonsillar swelling. Nontender teeth. She has tenderness to her right submandibular lymph nodes. Eyes:      Pupils: Pupils are equal, round, and reactive to light. Neck:      Thyroid: No thyromegaly. Vascular: No JVD. Trachea: No tracheal deviation. Cardiovascular:      Rate and Rhythm: Normal rate and regular rhythm. Heart sounds: Normal heart sounds. No murmur. No friction rub. No gallop. Pulmonary:      Effort: Pulmonary effort is normal. No respiratory distress. Breath sounds: Normal breath sounds. No stridor. No wheezing or rales. Chest:      Chest wall: No tenderness. Abdominal:      General: There is no distension. Palpations: Abdomen is soft. There is no mass. Tenderness: There is no abdominal tenderness. There is no guarding or rebound. Musculoskeletal:         General: No tenderness. Lymphadenopathy:      Cervical: No cervical adenopathy. Skin:     General: Skin is warm and dry. Coloration: Skin is not pale. Findings: No erythema or rash.    Neurological:      Mental Status: She is alert and oriented to person, place, and time. Psychiatric:         Behavior: Behavior normal.         Thought Content: Thought content normal.            Diagnostic Study Results     Labs -     Recent Results (from the past 12 hour(s))   STREP AG SCREEN, GROUP A    Collection Time: 01/22/21  3:57 PM    Specimen: Throat   Result Value Ref Range    Group A Strep Ag ID Negative     CBC WITH AUTOMATED DIFF    Collection Time: 01/22/21  6:21 PM   Result Value Ref Range    WBC 4.8 4.6 - 13.2 K/uL    RBC 3.62 (L) 4.20 - 5.30 M/uL    HGB 11.5 (L) 12.0 - 16.0 g/dL    HCT 35.0 35.0 - 45.0 %    MCV 96.7 74.0 - 97.0 FL    MCH 31.8 24.0 - 34.0 PG    MCHC 32.9 31.0 - 37.0 g/dL    RDW 12.5 11.6 - 14.5 %    PLATELET 626 016 - 208 K/uL    MPV 11.6 9.2 - 11.8 FL    NEUTROPHILS 54 40 - 73 %    LYMPHOCYTES 38 21 - 52 %    MONOCYTES 6 3 - 10 %    EOSINOPHILS 2 0 - 5 %    BASOPHILS 0 0 - 2 %    ABS. NEUTROPHILS 2.6 1.8 - 8.0 K/UL    ABS. LYMPHOCYTES 1.8 0.9 - 3.6 K/UL    ABS. MONOCYTES 0.3 0.05 - 1.2 K/UL    ABS. EOSINOPHILS 0.1 0.0 - 0.4 K/UL    ABS. BASOPHILS 0.0 0.0 - 0.1 K/UL    DF AUTOMATED     METABOLIC PANEL, COMPREHENSIVE    Collection Time: 01/22/21  6:21 PM   Result Value Ref Range    Sodium 144 136 - 145 mmol/L    Potassium 3.9 3.5 - 5.5 mmol/L    Chloride 108 100 - 111 mmol/L    CO2 30 21 - 32 mmol/L    Anion gap 6 3.0 - 18 mmol/L    Glucose 73 (L) 74 - 99 mg/dL    BUN 12 7.0 - 18 MG/DL    Creatinine 0.65 0.6 - 1.3 MG/DL    BUN/Creatinine ratio 18 12 - 20      GFR est AA >60 >60 ml/min/1.73m2    GFR est non-AA >60 >60 ml/min/1.73m2    Calcium 10.3 (H) 8.5 - 10.1 MG/DL    Bilirubin, total 0.4 0.2 - 1.0 MG/DL    ALT (SGPT) 32 13 - 56 U/L    AST (SGOT) 27 10 - 38 U/L    Alk.  phosphatase 102 45 - 117 U/L    Protein, total 7.9 6.4 - 8.2 g/dL    Albumin 4.0 3.4 - 5.0 g/dL    Globulin 3.9 2.0 - 4.0 g/dL    A-G Ratio 1.0 0.8 - 1.7     POC LACTIC ACID    Collection Time: 01/22/21  6:38 PM   Result Value Ref Range    Lactic Acid (POC) 0.57 0.40 - 2.00 mmol/L       Radiologic Studies -   CT NECK SOFT TISSUE W CONT   Final Result   1. No acute pathology appreciated in the soft tissues of the neck        CT Results  (Last 48 hours)               01/22/21 1925  CT NECK SOFT TISSUE W CONT Final result    Impression:  1. No acute pathology appreciated in the soft tissues of the neck       Narrative:  EXAM: CT of the Neck with IV contrast       INDICATION:  right neck pain       TECHNIQUE: CT of the soft tissues of the neck with IV contrast.       All CT scans at this facility are performed using dose optimization technique as   appropriate to a performed exam, to include automated exposure control,   adjustment of the mA and/or kV according to patient size (including appropriate   matching for site specific examination) or use of iterative reconstruction   technique. IV contrast: 80 cc of Isovue-300       Comparison: CT angiogram of the head and neck dated 1/19/2021 and head CT dated   1/19/2021       FINDINGS:   Mild mucosal thickening of the right maxilla sinus is noted. The visualized   ethmoid, sphenoid and left maxilla sinus are unremarkable. The visualized orbits   are unremarkable. The nasopharynx, hypopharynx and oropharynx are unremarkable. The palatine tonsils are unremarkable. The tongue base  is unremarkable. The epiglottis is unremarkable. The pyriform sinuses are normal.  The larynx is   unremarkable. The visualized trachea is unremarkable. The parotid glands are symmetric and unremarkable. No enlarged lymph nodes   appreciated in the soft tissues of the neck. No fluid collection appreciated. No soft tissue masses are found in the soft tissues of the neck. The visualized   thyroid is unremarkable. The superior mediastinum is unremarkable. The lung apices are unremarkable.                CXR Results  (Last 48 hours)    None            Medical Decision Making   I am the first provider for this patient. I reviewed the vital signs, available nursing notes, past medical history, past surgical history, family history and social history. Vital Signs-Reviewed the patient's vital signs. Procedures:  Procedures    Provider Notes (Medical Decision Making): nothing acute on CT neck soft tissue; also had ED attending evaluate patient. Throat culture pending. Pt has dental scheduled for next week. Advised her to continue taking her augmentin. MED RECONCILIATION:  Current Facility-Administered Medications   Medication Dose Route Frequency    sodium chloride 0.9 % bolus infusion 1,000 mL  1,000 mL IntraVENous ONCE    clindamycin phosphate (CLEOCIN) 900 mg in 0.9% sodium chloride (MBP/ADV) 100 mL MBP  900 mg IntraVENous NOW     Current Outpatient Medications   Medication Sig    amoxicillin-clavulanate (Augmentin) 875-125 mg per tablet Take 1 Tab by mouth two (2) times a day for 7 days.  atorvastatin (LIPITOR) 80 mg tablet Take 1 Tab by mouth daily. Indications: prevention of transient ischemic attack    aspirin delayed-release 81 mg tablet Take  by mouth daily. Disposition:  home    DISCHARGE NOTE:   8:58 PM    Pt has been reexamined. Patient has no new complaints, changes, or physical findings. Care plan outlined and precautions discussed. Results of labs, CT were reviewed with the patient. All medications were reviewed with the patient; will d/c home with augmentin. All of pt's questions and concerns were addressed. Patient was instructed and agrees to follow up with dentla, PCP, as well as to return to the ED upon further deterioration. Patient is ready to go home.     Follow-up Information     Follow up With Specialties Details Why Contact Info    Lola Essex, MD Family Medicine Schedule an appointment as soon as possible for a visit in 3 days  [address] [facility]  [phone]      keep your scheduled dental appointment for next wednesday        [facility] EMERGENCY DEPT Emergency Medicine  If symptoms worsen return immediately [address]  [phone]          Current Discharge Medication List          Diagnosis     Clinical Impression:   1.  Rowan Cintron

## 2021-01-24 LAB
BACTERIA SPEC CULT: NORMAL
SERVICE CMNT-IMP: NORMAL

## 2021-01-28 LAB
BACTERIA SPEC CULT: NORMAL
BACTERIA SPEC CULT: NORMAL
SERVICE CMNT-IMP: NORMAL
SERVICE CMNT-IMP: NORMAL

## 2021-11-01 ENCOUNTER — HOSPITAL ENCOUNTER (OUTPATIENT)
Dept: GENERAL RADIOLOGY | Age: 51
Discharge: HOME | End: 2021-11-01
Payer: MEDICAID

## 2021-11-01 DIAGNOSIS — M25.561 RIGHT KNEE PAIN: ICD-10-CM

## 2021-11-01 PROCEDURE — 73564 X-RAY EXAM KNEE 4 OR MORE: CPT

## 2021-12-21 ENCOUNTER — TRANSCRIBE ORDER (OUTPATIENT)
Dept: SCHEDULING | Age: 51
End: 2021-12-21

## 2021-12-21 DIAGNOSIS — Z12.31 BREAST CANCER SCREENING BY MAMMOGRAM: Primary | ICD-10-CM

## 2022-01-27 ENCOUNTER — OFFICE VISIT (OUTPATIENT)
Dept: ORTHOPEDIC SURGERY | Age: 52
End: 2022-01-27
Payer: MEDICAID

## 2022-01-27 VITALS
RESPIRATION RATE: 16 BRPM | HEIGHT: 63 IN | BODY MASS INDEX: 32.82 KG/M2 | OXYGEN SATURATION: 98 % | TEMPERATURE: 97 F | WEIGHT: 185.2 LBS | HEART RATE: 57 BPM

## 2022-01-27 DIAGNOSIS — M25.561 CHRONIC PAIN OF RIGHT KNEE: ICD-10-CM

## 2022-01-27 DIAGNOSIS — M17.11 PRIMARY OSTEOARTHRITIS OF RIGHT KNEE: Primary | ICD-10-CM

## 2022-01-27 DIAGNOSIS — G89.29 CHRONIC PAIN OF RIGHT KNEE: ICD-10-CM

## 2022-01-27 PROCEDURE — 20610 DRAIN/INJ JOINT/BURSA W/O US: CPT | Performed by: SPECIALIST

## 2022-01-27 PROCEDURE — 99203 OFFICE O/P NEW LOW 30 MIN: CPT | Performed by: SPECIALIST

## 2022-01-27 RX ORDER — BETAMETHASONE SODIUM PHOSPHATE AND BETAMETHASONE ACETATE 3; 3 MG/ML; MG/ML
3 INJECTION, SUSPENSION INTRA-ARTICULAR; INTRALESIONAL; INTRAMUSCULAR; SOFT TISSUE ONCE
Status: COMPLETED | OUTPATIENT
Start: 2022-01-27 | End: 2022-01-27

## 2022-01-27 RX ADMIN — BETAMETHASONE SODIUM PHOSPHATE AND BETAMETHASONE ACETATE 3 MG: 3; 3 INJECTION, SUSPENSION INTRA-ARTICULAR; INTRALESIONAL; INTRAMUSCULAR; SOFT TISSUE at 11:38

## 2022-01-27 NOTE — PATIENT INSTRUCTIONS
Knee: Exercises  Introduction  Here are some examples of exercises for you to try. The exercises may be suggested for a condition or for rehabilitation. Start each exercise slowly. Ease off the exercises if you start to have pain. You will be told when to start these exercises and which ones will work best for you. How to do the exercises  Quad sets    1. Sit with your leg straight and supported on the floor or a firm bed. (If you feel discomfort in the front or back of your knee, place a small towel roll under your knee.)  2. Tighten the muscles on top of your thigh by pressing the back of your knee flat down to the floor. (If you feel discomfort under your kneecap, place a small towel roll under your knee.)  3. Hold for about 6 seconds, then rest for up to 10 seconds. 4. Do 8 to 12 repetitions several times a day. Straight-leg raises to the front    1. Lie on your back with your good knee bent so that your foot rests flat on the floor. Your injured leg should be straight. Make sure that your low back has a normal curve. You should be able to slip your flat hand in between the floor and the small of your back, with your palm touching the floor and your back touching the back of your hand. 2. Tighten the thigh muscles in the injured leg by pressing the back of your knee flat down to the floor. Hold your knee straight. 3. Keeping the thigh muscles tight, lift your injured leg up so that your heel is about 12 inches off the floor. Hold for about 6 seconds and then lower slowly. 4. Do 8 to 12 repetitions, 3 times a day. Straight-leg raises to the outside    1. Lie on your side, with your injured leg on top. 2. Tighten the front thigh muscles of your injured leg to keep your knee straight. 3. Keep your hip and your leg straight in line with the rest of your body, and keep your knee pointing forward. Do not drop your hip back.   4. Lift your injured leg straight up toward the ceiling, about 12 inches off the floor. Hold for about 6 seconds, then slowly lower your leg. 5. Do 8 to 12 repetitions. Straight-leg raises to the back    1. Lie on your stomach, and lift your leg straight up behind you (toward the ceiling). 2. Lift your toes about 6 inches off the floor, hold for about 6 seconds, then lower slowly. 3. Do 8 to 12 repetitions. Straight-leg raises to the inside    1. Lie on the side of your body with the injured leg. 2. You can either prop your other (good) leg up on a chair, or you can bend your good knee and put that foot in front of your injured knee. Do not drop your hip back. 3. Tighten the muscles on the front of your thigh to straighten your injured knee. 4. Keep your kneecap pointing forward, and lift your whole leg up toward the ceiling about 6 inches. Hold for about 6 seconds, then lower slowly. 5. Do 8 to 12 repetitions. Heel dig bridging    1. Lie on your back with both knees bent and your ankles bent so that only your heels are digging into the floor. Your knees should be bent about 90 degrees. 2. Then push your heels into the floor, squeeze your buttocks, and lift your hips off the floor until your shoulders, hips, and knees are all in a straight line. 3. Hold for about 6 seconds as you continue to breathe normally, and then slowly lower your hips back down to the floor and rest for up to 10 seconds. 4. Do 8 to 12 repetitions. Hamstring curls    1. Lie on your stomach with your knees straight. If your kneecap is uncomfortable, roll up a washcloth and put it under your leg just above your kneecap. 2. Lift the foot of your injured leg by bending the knee so that you bring the foot up toward your buttock. If this motion hurts, try it without bending your knee quite as far. This may help you avoid any painful motion. 3. Slowly lower your leg back to the floor. 4. Do 8 to 12 repetitions.   5. With permission from your doctor or physical therapist, you may also want to add a cuff weight to your ankle (not more than 5 pounds). With weight, you do not have to lift your leg more than 12 inches to get a hamstring workout. Shallow standing knee bends    Do this exercise only if you have very little pain; if you have no clicking, locking, or giving way if you have an injured knee; and if it does not hurt while you are doing 8 to 12 repetitions. 1. Stand with your hands lightly resting on a counter or chair in front of you. Put your feet shoulder-width apart. 2. Slowly bend your knees so that you squat down like you are going to sit in a chair. Make sure your knees do not go in front of your toes. 3. Lower yourself about 6 inches. Your heels should remain on the floor at all times. 4. Rise slowly to a standing position. Heel raises    1. Stand with your feet a few inches apart, with your hands lightly resting on a counter or chair in front of you. 2. Slowly raise your heels off the floor while keeping your knees straight. 3. Hold for about 6 seconds, then slowly lower your heels to the floor. 4. Do 8 to 12 repetitions several times during the day. Follow-up care is a key part of your treatment and safety. Be sure to make and go to all appointments, and call your doctor if you are having problems. It's also a good idea to know your test results and keep a list of the medicines you take. Where can you learn more? Go to http://www.gray.com/  Enter V911 in the search box to learn more about \"Knee: Exercises. \"  Current as of: July 1, 2021               Content Version: 13.0  © 8742-7450 Healthwise, Inc[employer]. Care instructions adapted under license by [employer] (which disclaims liability or warranty for this information). If you have questions about a medical condition or this instruction, always ask your healthcare professional. [place] [address] any warranty or liability for your use of this information.

## 2022-01-27 NOTE — PROGRESS NOTES
Patient: Mell Boyle                MRN: [id number]       SSN: [id number]  YOB: 1970        AGE: 46 y.o. SEX: female    PCP: Chan Yañez MD  01/27/22    Chief Complaint   Patient presents with    Knee Pain     right knee pain     HISTORY:  Mell Boyle is a 46 y.o. female who was referred by Dr. Omayra Miller for the evaluation and treatment of right knee pain. She has been experiencing right knee pain for the past several months. She does not recall any injury. She feels pain with standing, walking and stair climbing. She experiences startup pain and a catching sensation after sitting. She feels popping and grinding sensations when she moves. She says her knee is so swollen sometimes that it's hard for her to put on her pants. Pain Assessment  1/27/2022   Location of Pain Knee   Location Modifiers Right   Severity of Pain 2   Quality of Pain Sharp   Quality of Pain Comment swelling   Duration of Pain Persistent   Frequency of Pain Constant   Aggravating Factors Standing   Relieving Factors Exercises   Result of Injury No     Occupation, etc:  Ms. Moses Edward works as a  at the [employer]. She is from [facility]. She lives with her 15 yo daughter and 5 yo son in [facility]. She has lived in [place] for the past 6 years. She also lived in [place] for 2 years. Ms. Moses Edward weighs 184 lbs and is 5'3\" tall.        Lab Results   Component Value Date/Time    Hemoglobin A1c 4.8 01/19/2021 12:33 PM     Weight Metrics 1/27/2022 1/19/2021 6/28/2018 6/19/2018 6/9/2018 3/2/2018 4/21/2017   Weight 185 lb 3.2 oz 184 lb 185 lb 178 lb 187 lb 8 oz 184 lb 9.6 oz 175 lb 3.2 oz   BMI 32.81 kg/m2 32.59 kg/m2 32.77 kg/m2 31.53 kg/m2 33.21 kg/m2 32.7 kg/m2 31.04 kg/m2       Patient Active Problem List   Diagnosis Code    TIA (transient ischemic attack) G45.9    Gastroesophageal reflux disease without esophagitis K21.9    Sacroiliac joint dysfunction of both sides M53.3    Class 1 obesity due to excess calories without serious comorbidity with body mass index (BMI) of 32.0 to 32.9 in adult E66.09, Z68.32    Menstrual migraine G43.829     REVIEW OF SYSTEMS:    Constitutional Symptoms: Negative   Eyes: Negative   Ears, Nose, Throat and Mouth: Negative   Cardiovascular: Negative   Respiratory: Negative   Genitourinary: Per HPI   Gastrointestinal: Per HPI   Integumentary (Skin and/or Breast): Negative   Musculoskeletal: Per HPI   Endocrine/Rheumatologic: Negative   Neurological: Per HPI   Hematology/Lymphatic: Negative    Allergic/Immunologic: Negative   Phychiatric: Negative    Social History     Socioeconomic History    Marital status:      Spouse name: Not on file    Number of children: Not on file    Years of education: Not on file    Highest education level: Not on file   Occupational History    Not on file   Tobacco Use    Smoking status: Never Smoker    Smokeless tobacco: Never Used   Substance and Sexual Activity    Alcohol use: No    Drug use: No    Sexual activity: Not Currently   Other Topics Concern    Not on file   Social History Narrative    ** Merged History Encounter **          Social Determinants of Health     Financial Resource Strain:     Difficulty of Paying Living Expenses: Not on file   Food Insecurity:     Worried About Running Out of Food in the Last Year: Not on file    Nella of Food in the Last Year: Not on file   Transportation Needs:     Lack of Transportation (Medical): Not on file    Lack of Transportation (Non-Medical):  Not on file   Physical Activity:     Days of Exercise per Week: Not on file    Minutes of Exercise per Session: Not on file   Stress:     Feeling of Stress : Not on file   Social Connections:     Frequency of Communication with Friends and Family: Not on file    Frequency of Social Gatherings with Friends and Family: Not on file    Attends Yazdanism Services: Not on file    Active Member of Clubs or Organizations: Not on file    Attends Club or Organization Meetings: Not on file    Marital Status: Not on file   Intimate Partner Violence:     Fear of Current or Ex-Partner: Not on file    Emotionally Abused: Not on file    Physically Abused: Not on file    Sexually Abused: Not on file   Housing Stability:     Unable to Pay for Housing in the Last Year: Not on file    Number of Norismocyndi in the Last Year: Not on file    Unstable Housing in the Last Year: Not on file      No Known Allergies   Current Outpatient Medications   Medication Sig    atorvastatin (LIPITOR) 80 mg tablet Take 1 Tab by mouth daily. Indications: prevention of transient ischemic attack (Patient not taking: Reported on 1/27/2022)    aspirin delayed-release 81 mg tablet Take  by mouth daily. (Patient not taking: Reported on 1/27/2022)     No current facility-administered medications for this visit.       PHYSICAL EXAMINATION:  Visit Vitals  Pulse (!) 57   Temp 97 °F (36.1 °C) (Temporal)   Resp 16   Ht 5' 3\" (1.6 m)   Wt 185 lb 3.2 oz (84 kg)   LMP 03/23/2017 (Approximate)   SpO2 98%   BMI 32.81 kg/m²      ORTHO EXAMINATION:  Examination Right knee Left knee   Skin Intact Intact   Range of motion 100-0 120-0   Effusion + -   Medial joint line tenderness + -   Lateral joint line tenderness + -   Popliteal tenderness - -   Osteophytes palpable - -   Shawns - -   Patella crepitus - -   Anterior drawer - -   Lateral laxity - -   Medial laxity - -   Varus deformity - -   Valgus deformity - -   Pretibial edema + -   Calf tenderness - -     TIME OUT:  Chart reviewed for the following:   Kranthi Boss MD, have reviewed the History, Physical and updated the Allergic reactions for Bere Stone   TIME OUT performed immediately prior to start of procedure:  Kranthi Boss MD, have performed the following reviews on Bere Stone prior to the start of the procedure:          * Patient was identified by name and date of birth   * Agreement on procedure being performed was verified  * Risks and Benefits explained to the patient  * Procedure site verified and marked as necessary  * Patient was positioned for comfort  * Consent was obtained     Time: 11:24 AM     Date of procedure: 1/27/2022  Procedure performed by:  Robin Silva MD  Ms. Stone tolerated the procedure well with no complications. RADIOGRAPHS:  XR RIGHT KNEE 11/1/21 [facility]  IMPRESSION   Small joint effusion. IMPRESSION:      ICD-10-CM ICD-9-CM    1. Primary osteoarthritis of right knee  M17.11 715.16 betamethasone (CELESTONE) injection 3 mg      DRAIN/INJECT LARGE JOINT/BURSA   2. Chronic pain of right knee  M25.561 719.46 betamethasone (CELESTONE) injection 3 mg    G89.29 338.29 DRAIN/INJECT LARGE JOINT/BURSA     PLAN:  At home exercises provided. After discussing treatment options, patient's right knee was injected with 4 cc Marcaine and 1/2 cc Celestone. We discussed a possible need for a right knee MRI in the future if pain continues. She will follow up as needed.       Scribed by Robin Silva MD [address]) as dictated by Robin Silva MD

## 2022-03-19 PROBLEM — E66.811 CLASS 1 OBESITY DUE TO EXCESS CALORIES WITHOUT SERIOUS COMORBIDITY WITH BODY MASS INDEX (BMI) OF 32.0 TO 32.9 IN ADULT: Status: ACTIVE | Noted: 2018-03-02

## 2022-03-19 PROBLEM — E66.09 CLASS 1 OBESITY DUE TO EXCESS CALORIES WITHOUT SERIOUS COMORBIDITY WITH BODY MASS INDEX (BMI) OF 32.0 TO 32.9 IN ADULT: Status: ACTIVE | Noted: 2018-03-02

## 2022-03-19 PROBLEM — G43.829 MENSTRUAL MIGRAINE: Status: ACTIVE | Noted: 2018-06-08

## 2022-04-05 ENCOUNTER — TRANSCRIBE ORDER (OUTPATIENT)
Dept: SCHEDULING | Age: 52
End: 2022-04-05

## 2022-04-05 DIAGNOSIS — Z12.31 VISIT FOR SCREENING MAMMOGRAM: Primary | ICD-10-CM

## 2022-04-12 ENCOUNTER — OFFICE VISIT (OUTPATIENT)
Dept: VASCULAR SURGERY | Age: 52
End: 2022-04-12
Payer: MEDICAID

## 2022-04-12 VITALS
WEIGHT: 185.19 LBS | SYSTOLIC BLOOD PRESSURE: 114 MMHG | BODY MASS INDEX: 32.81 KG/M2 | HEIGHT: 63 IN | DIASTOLIC BLOOD PRESSURE: 70 MMHG | OXYGEN SATURATION: 99 % | HEART RATE: 64 BPM

## 2022-04-12 DIAGNOSIS — I87.2 VENOUS (PERIPHERAL) INSUFFICIENCY: Primary | ICD-10-CM

## 2022-04-12 PROCEDURE — 99214 OFFICE O/P EST MOD 30 MIN: CPT | Performed by: PHYSICIAN ASSISTANT

## 2022-04-12 NOTE — PATIENT INSTRUCTIONS
Learning About Venous Insufficiency  What is it? Venous insufficiency is a problem with the flow of blood from the veins of the legs back to the heart. It's also called chronic venous insufficiency or chronic venous stasis. Your veins bring blood back to the heart after it flows through your body. Veins have valves that keep the blood moving in one directiontoward the heart. But with venous insufficiency, the veins of the legs might not work as they should. This can allow blood to leak backward. Fluid can pool in the legs. This can lead to problems that include varicose veins. What causes it? Venous insufficiency is sometimes caused by deep vein thrombosis and high blood pressure inside leg veins. You are more likely to have venous insufficiency if you:  · Are older. · Are female. · Are overweight. · Don't get enough physical activity. · Smoke. · Have a family history of varicose veins. What are the symptoms? Symptoms of venous insufficiency affect the legs. They may include:  · Swelling, often in the ankles. · A rash. · Varicose veins. · Itching. · Cramping. · Skin sores (ulcers). · Aching or a feeling of heaviness. · Changes in skin color. How is it diagnosed? Your doctor can diagnose venous insufficiency by examining your legs and by using a type of ultrasound test (duplex Doppler) to find out how well blood is flowing in your legs. How is it treated? To reduce swelling and relieve pain caused by venous insufficiency, you can wear compression stockings. They are tighter at the ankles than at the top of the legs. They also can help venous skin ulcers heal. But there are different types of stockings, and they need to fit right. So your doctor will recommend what you need. You also can try to:  · Get more exercise, especially walking. It can increase blood flow. · Avoid standing still or sitting for a long time, which can make the fluid pool in your legs.   · Try not to sit with your legs crossed at the knee. · Keep your legs raised above your heart when you're lying down. This reduces swelling. If these treatments don't work, you may need medicine or a procedure to help relieve symptoms. Procedures might be done to close the vein, to remove the vein, or to improve blood flow. Follow-up care is a key part of your treatment and safety. Be sure to make and go to all appointments, and call your doctor if you are having problems. It's also a good idea to know your test results and keep a list of the medicines you take. Current as of: July 6, 2021               Content Version: 13.2  © 2006-2022 Healthwise, [employer]. Care instructions adapted under license by [employer] (which disclaims liability or warranty for this information). If you have questions about a medical condition or this instruction, always ask your healthcare professional. [address] any warranty or liability for your use of this information.

## 2022-04-12 NOTE — PROGRESS NOTES
1. Have you been to an emergency room or urgent care clinic since your last visit? No    Hospitalized since your last visit? If yes, where, when, and reason for visit? No    2. Have you seen or consulted any other health care providers outside of the [facility] since your last visit including any procedures, health maintenance items. If yes, where, when and reason for visit?  No

## 2022-04-13 NOTE — PROGRESS NOTES
Chief Complaint   Patient presents with    New Patient    Leg Pain         Impression: Bilateral lower extremity venous insufficiency  46 y.o. female with complaints of bilateral lower extremity leg fatigue, heaviness, and increasing varicosities of both lower extremities but right greater than left. History and Physical    Agapito Leyden is a pleasant 46y.o. year old female who presents today with complaints of bilateral lower extremity leg fatigue, increasing edema, increasing varicosities, and heaviness. Patient states that she can ambulate daily 3-4 blocks easily without any leg pain or discomfort but by the end of the day her legs are extremely heavy and fatigued and tired. She denies any recent ulceration skin rashes or skin lesions but states that her legs have become increasingly swollen and notable varicosities. Patient states that her varicosities have gotten worse over the past 4 to 6 months and she presents today to see if there is any thing that can be done to help decrease the progression. She denies any chest pain or shortness of breath. She denies any fever or chills. As stated she ambulates daily without any leg pain. She continues to take daily meds as recommended including aspirin statin medication. She denies wearing any compression stockings, and states that she does try to elevate her legs when sitting in a recliner. She denies elevating her legs above the level of her heart. She denies any history of slow or nonhealing wounds in her lower extremities. Past Medical History:   Diagnosis Date    Cardiac nuclear imaging test 09/30/2014    Low risk. No ischemia or prior infarction. EF 63%. No RWMA. Normal EKG on pharm stress test.    Cardiac transesophageal echocardiography (TASHA) 09/30/2014    EF 55%. Very sm R-L shunt suggestive of PFO. No LA appendage thrombus.  Cardiovascular LLE venous duplex 07/14/2015    Left leg:  No DVT.      Patient Active Problem List Diagnosis Code    TIA (transient ischemic attack) G45.9    Gastroesophageal reflux disease without esophagitis K21.9    Sacroiliac joint dysfunction of both sides M53.3    Class 1 obesity due to excess calories without serious comorbidity with body mass index (BMI) of 32.0 to 32.9 in adult E66.09, Z68.32    Menstrual migraine G43.829     Past Surgical History:   Procedure Laterality Date    HX  SECTION  ,     Current Outpatient Medications   Medication Sig Dispense Refill    atorvastatin (LIPITOR) 80 mg tablet Take 1 Tab by mouth daily. Indications: prevention of transient ischemic attack (Patient not taking: Reported on 2022) 90 Tab 3    aspirin delayed-release 81 mg tablet Take  by mouth daily. (Patient not taking: Reported on 2022)       No Known Allergies  Social History     Socioeconomic History    Marital status:      Spouse name: Not on file    Number of children: Not on file    Years of education: Not on file    Highest education level: Not on file   Occupational History    Not on file   Tobacco Use    Smoking status: Never Smoker    Smokeless tobacco: Never Used   Vaping Use    Vaping Use: Never used   Substance and Sexual Activity    Alcohol use: No    Drug use: No    Sexual activity: Not Currently   Other Topics Concern    Not on file   Social History Narrative    ** Merged History Encounter **          Social Determinants of Health     Financial Resource Strain:     Difficulty of Paying Living Expenses: Not on file   Food Insecurity:     Worried About [address] in the Last Year: Not on file    [address] in the Last Year: Not on file   Transportation Needs:     Lack of Transportation (Medical): Not on file    Lack of Transportation (Non-Medical):  Not on file   Physical Activity:     Days of Exercise per Week: Not on file    Minutes of Exercise per Session: Not on file   Stress:     Feeling of Stress : Not on file   Social Connections:     Frequency of Communication with Friends and Family: Not on file    Frequency of Social Gatherings with Friends and Family: Not on file    Attends Synagogue Services: Not on file    Active Member of Clubs or Organizations: Not on file    Attends Club or Organization Meetings: Not on file    Marital Status: Not on file   Intimate Partner Violence:     Fear of Current or Ex-Partner: Not on file    Emotionally Abused: Not on file    Physically Abused: Not on file    Sexually Abused: Not on file   Housing Stability:     Unable to Pay for Housing in the Last Year: Not on file    Number of [place] in the Last Year: Not on file    Unstable Housing in the Last Year: Not on file      History reviewed. No pertinent family history. Review of Systems    General: negative for fever/chills. Ambulates daily 3-4 blocks without any leg pain or discomfort. Denies any leg pain at rest.  Reports increasing fatigue tired and heavy legs at the end of the night. Eyes: negative for vision loss   HENT: negative for cold symptoms   Respiratory negative for shortness of breath   Cardiac: negative for chest pain   Vascular negative for foot pain at night    Gastrointestinal: negative for abdominal pain   Genitourinary: negative for dysuria    Endocrine: negative for excessive thirst   Skin: negative for rash/ulcers or lesions. Positive for increasing varicosities in both lower extremities right greater than left. Neurological: negative for paralysis   Psychiatric: negative for depression          Physical Exam:    Visit Vitals  /70 (BP 1 Location: Left upper arm, BP Patient Position: Sitting)   Pulse 64   Ht 5' 3\" (1.6 m)   Wt 185 lb 3 oz (84 kg)   LMP 03/23/2017 (Approximate)   SpO2 99%   BMI 32.80 kg/m²      Constitutional:  Patient is well developed, well nourished, and not distressed. Ambulated into the room albeit slowly with a normal steady gait. Slightly obese.   HEENT: atraumatic, normocephalic, wearing a mask. Eyes:   Cunjunctivae clear, no scleral icterus  Neck:   No JVD present. No carotid bruits or thrills appreciated bilaterally. Cardiovascular:  Normal rate, regular rhythm, normal heart sounds. No murmur heard. No ectopy noted. Pulmonary/Chest: Effort normal .  No wheezes rales or rhonchi is noted. Extremities: Normal range of motion. 1+ bilateral pedal edema noted up to her sock line. No calf tenderness to palpation bilaterally. Equal strength bilaterally. No ulceration skin rashes or skin lesions noted bilaterally. Multiple varicosities and spider veins noted in both ankles. Conglomerate of varicosities noted in the right posterior thigh region. All nontender to palpation. Neurovascular intact to both soft and deep sensation bilaterally. Neurological:  he  is alert and oriented x3 . Gait normal. Motor & sensory grossly intact in all 4 limbs. Psych: Appropriate mood and affect. Skin:  Skin is warm and dry. No ulcerations  Pulses: Albeit slightly diminished due to edema palpable DP PT pulses bilaterally. 2+ bilateral femoral pulses noted. 2+ bilateral popliteal pulses noted. Carotid: No carotid bruits or thrills appreciated bilaterally. Plan:  Venous insufficiency with varicosities bilaterally  Patient instructed on the importance of compression elevation in the face of venous insufficiency. Patient educated on proper elevation. Patient instructed that she must lay flat with 2-3 pillows under her legs to get her legs 20 to 30 inches above the level of her heart for proper elevation. Patient willing to try. Long discussion with patient about compression therapy. Patient given a prescription for compression stockings 20 to 30 mmHg and instructed on the importance of being compliant. Patient willing to try. Brief discussion with patient about venous reflux disease and ablation therapy for such disease.   Patient instructed that if she is to be compliant with compression and elevation we will bring her back in 3 months and perform bilateral lower extremity venous reflux imaging. Upon next visit we will discuss results and see if the patient is a candidate for ablation procedure. Patient states she understands above, is more than willing to be compliant with above recommendations, and is willing to proceed as planned. Follow-up in 3 months with bilateral lower extremity venous reflux imaging. In the meantime patient encouraged to make better lifestyle choices, better nutritional choices, increasing her daily activity as tolerated, and of course smoking cessation. Patient also informed on the importance of weight loss. We will discuss details with my attending. The treatment plan was reviewed with the patient in detail. The patient voiced understanding of this plan and all questions and concerns were addressed. The patient agrees with this plan. We discussed the signs and symptoms that would require earlier attention or intervention. I appreciate the opportunity to participate in the care of your patient. I will be sure to keep you informed of any subsequent changes in the treatment plan. If you have any questions or concerns, please feel free to contact me.       Carlee Vieira PA-C  Vascular Physician Assistant  [phone]

## 2022-07-12 DIAGNOSIS — I87.2 VENOUS (PERIPHERAL) INSUFFICIENCY: ICD-10-CM

## 2022-08-23 ENCOUNTER — TRANSCRIBE ORDER (OUTPATIENT)
Dept: SCHEDULING | Age: 52
End: 2022-08-23

## 2022-08-23 DIAGNOSIS — Z12.31 VISIT FOR SCREENING MAMMOGRAM: Primary | ICD-10-CM

## 2023-11-05 ENCOUNTER — HOSPITAL ENCOUNTER (EMERGENCY)
Facility: HOSPITAL | Age: 53
Discharge: HOME | End: 2023-11-05
Attending: EMERGENCY MEDICINE
Payer: COMMERCIAL

## 2023-11-05 ENCOUNTER — APPOINTMENT (OUTPATIENT)
Facility: HOSPITAL | Age: 53
End: 2023-11-05
Payer: COMMERCIAL

## 2023-11-05 VITALS
TEMPERATURE: 97.1 F | HEIGHT: 62 IN | SYSTOLIC BLOOD PRESSURE: 127 MMHG | DIASTOLIC BLOOD PRESSURE: 85 MMHG | HEART RATE: 66 BPM | WEIGHT: 175 LBS | OXYGEN SATURATION: 100 % | RESPIRATION RATE: 14 BRPM | BODY MASS INDEX: 32.2 KG/M2

## 2023-11-05 DIAGNOSIS — R10.13 ABDOMINAL PAIN, EPIGASTRIC: Primary | ICD-10-CM

## 2023-11-05 LAB
ALBUMIN SERPL-MCNC: 3.8 G/DL (ref 3.4–5)
ALBUMIN/GLOB SERPL: 1 (ref 0.8–1.7)
ALP SERPL-CCNC: 101 U/L (ref 45–117)
ALT SERPL-CCNC: 34 U/L (ref 13–56)
ANION GAP SERPL CALC-SCNC: 3 MMOL/L (ref 3–18)
APPEARANCE UR: CLEAR
AST SERPL-CCNC: 22 U/L (ref 10–38)
BACTERIA URNS QL MICRO: ABNORMAL /HPF
BASOPHILS # BLD: 0 K/UL (ref 0–0.1)
BASOPHILS NFR BLD: 0 % (ref 0–2)
BILIRUB SERPL-MCNC: 0.5 MG/DL (ref 0.2–1)
BILIRUB UR QL: NEGATIVE
BUN SERPL-MCNC: 7 MG/DL (ref 7–18)
BUN/CREAT SERPL: 12 (ref 12–20)
CALCIUM SERPL-MCNC: 10.1 MG/DL (ref 8.5–10.1)
CHLORIDE SERPL-SCNC: 108 MMOL/L (ref 100–111)
CO2 SERPL-SCNC: 31 MMOL/L (ref 21–32)
COLOR UR: YELLOW
CREAT SERPL-MCNC: 0.57 MG/DL (ref 0.6–1.3)
DIFFERENTIAL METHOD BLD: ABNORMAL
EKG ATRIAL RATE: 62 BPM
EKG DIAGNOSIS: NORMAL
EKG P AXIS: 68 DEGREES
EKG P-R INTERVAL: 136 MS
EKG Q-T INTERVAL: 392 MS
EKG QRS DURATION: 70 MS
EKG QTC CALCULATION (BAZETT): 397 MS
EKG R AXIS: 56 DEGREES
EKG T AXIS: -53 DEGREES
EKG VENTRICULAR RATE: 62 BPM
EOSINOPHIL # BLD: 0.1 K/UL (ref 0–0.4)
EOSINOPHIL NFR BLD: 2 % (ref 0–5)
EPITH CASTS URNS QL MICRO: ABNORMAL /LPF (ref 0–5)
ERYTHROCYTE [DISTWIDTH] IN BLOOD BY AUTOMATED COUNT: 12.1 % (ref 11.6–14.5)
FLUAV RNA SPEC QL NAA+PROBE: NOT DETECTED
FLUBV RNA SPEC QL NAA+PROBE: NOT DETECTED
GLOBULIN SER CALC-MCNC: 3.9 G/DL (ref 2–4)
GLUCOSE SERPL-MCNC: 90 MG/DL (ref 74–99)
GLUCOSE UR STRIP.AUTO-MCNC: NEGATIVE MG/DL
HCT VFR BLD AUTO: 35.4 % (ref 35–45)
HGB BLD-MCNC: 11.3 G/DL (ref 12–16)
HGB UR QL STRIP: NEGATIVE
IMM GRANULOCYTES # BLD AUTO: 0 K/UL (ref 0–0.04)
IMM GRANULOCYTES NFR BLD AUTO: 0 % (ref 0–0.5)
KETONES UR QL STRIP.AUTO: NEGATIVE MG/DL
LEUKOCYTE ESTERASE UR QL STRIP.AUTO: ABNORMAL
LYMPHOCYTES # BLD: 1.9 K/UL (ref 0.9–3.6)
LYMPHOCYTES NFR BLD: 36 % (ref 21–52)
MCH RBC QN AUTO: 31.8 PG (ref 24–34)
MCHC RBC AUTO-ENTMCNC: 31.9 G/DL (ref 31–37)
MCV RBC AUTO: 99.7 FL (ref 78–100)
MONOCYTES # BLD: 0.5 K/UL (ref 0.05–1.2)
MONOCYTES NFR BLD: 9 % (ref 3–10)
NEUTS SEG # BLD: 2.7 K/UL (ref 1.8–8)
NEUTS SEG NFR BLD: 52 % (ref 40–73)
NITRITE UR QL STRIP.AUTO: NEGATIVE
NRBC # BLD: 0 K/UL (ref 0–0.01)
NRBC BLD-RTO: 0 PER 100 WBC
NT PRO BNP: 46 PG/ML (ref 0–900)
PH UR STRIP: 6.5 (ref 5–8)
PLATELET # BLD AUTO: 208 K/UL (ref 135–420)
PMV BLD AUTO: 11.2 FL (ref 9.2–11.8)
POTASSIUM SERPL-SCNC: 4 MMOL/L (ref 3.5–5.5)
PROT SERPL-MCNC: 7.7 G/DL (ref 6.4–8.2)
PROT UR STRIP-MCNC: NEGATIVE MG/DL
RBC # BLD AUTO: 3.55 M/UL (ref 4.2–5.3)
RBC #/AREA URNS HPF: NEGATIVE /HPF (ref 0–5)
SARS-COV-2 RNA RESP QL NAA+PROBE: NOT DETECTED
SODIUM SERPL-SCNC: 142 MMOL/L (ref 136–145)
SP GR UR REFRACTOMETRY: 1 (ref 1–1.03)
TROPONIN I SERPL HS-MCNC: 5 NG/L (ref 0–54)
UROBILINOGEN UR QL STRIP.AUTO: 0.2 EU/DL (ref 0.2–1)
WBC # BLD AUTO: 5.2 K/UL (ref 4.6–13.2)
WBC URNS QL MICRO: ABNORMAL /HPF (ref 0–4)

## 2023-11-05 PROCEDURE — 81001 URINALYSIS AUTO W/SCOPE: CPT

## 2023-11-05 PROCEDURE — 84484 ASSAY OF TROPONIN QUANT: CPT

## 2023-11-05 PROCEDURE — 99285 EMERGENCY DEPT VISIT HI MDM: CPT

## 2023-11-05 PROCEDURE — 85025 COMPLETE CBC W/AUTO DIFF WBC: CPT

## 2023-11-05 PROCEDURE — 87636 SARSCOV2 & INF A&B AMP PRB: CPT

## 2023-11-05 PROCEDURE — 80053 COMPREHEN METABOLIC PANEL: CPT

## 2023-11-05 PROCEDURE — 83880 ASSAY OF NATRIURETIC PEPTIDE: CPT

## 2023-11-05 PROCEDURE — 93005 ELECTROCARDIOGRAM TRACING: CPT | Performed by: PHYSICIAN ASSISTANT

## 2023-11-05 PROCEDURE — 6370000000 HC RX 637 (ALT 250 FOR IP)

## 2023-11-05 PROCEDURE — 71046 X-RAY EXAM CHEST 2 VIEWS: CPT

## 2023-11-05 PROCEDURE — 93010 ELECTROCARDIOGRAM REPORT: CPT | Performed by: INTERNAL MEDICINE

## 2023-11-05 RX ORDER — OMEPRAZOLE 20 MG/1
20 CAPSULE, DELAYED RELEASE ORAL
Qty: 180 CAPSULE | Refills: 1 | Status: SHIPPED | OUTPATIENT
Start: 2023-11-05

## 2023-11-05 RX ADMIN — ALUMINUM HYDROXIDE, MAGNESIUM HYDROXIDE, AND SIMETHICONE 40 ML: 200; 200; 20 SUSPENSION ORAL at 13:46

## 2023-11-05 ASSESSMENT — ENCOUNTER SYMPTOMS
CONSTIPATION: 0
DIARRHEA: 0
SHORTNESS OF BREATH: 0
SINUS PAIN: 0
CHEST TIGHTNESS: 0
COUGH: 1
SINUS PRESSURE: 0
SORE THROAT: 1
ABDOMINAL PAIN: 0

## 2023-11-05 ASSESSMENT — LIFESTYLE VARIABLES
HOW MANY STANDARD DRINKS CONTAINING ALCOHOL DO YOU HAVE ON A TYPICAL DAY: PATIENT DOES NOT DRINK
HOW OFTEN DO YOU HAVE A DRINK CONTAINING ALCOHOL: NEVER

## 2023-11-05 NOTE — ED PROVIDER NOTES
EMERGENCY DEPARTMENT HISTORY AND PHYSICAL EXAM      Patient: Sandie Huerta Age: 52 y.o. Sex: female    YOB: 1970 Admit Date: 11/5/2023 PCP: Ciarra Black MD   MRN: [id number]  CSN: [id number]     Room: ER11/11 Time Dictated: 3:46 PM        Chief Complaint   Chief Complaint   Patient presents with    Chest Pain       History of Present Illness   Sandie Huerta is a 52 y.o. female with past medical history including TIA, DVT, GERD who presents to the ED with the chief complaint of chest pain. Reports that for approximately 1 month she has had a sore throat and congestion, was seen by patient first approximately 2 weeks prior at which point she tested negative for COVID and flu and was advised to treat symptomatically. Since that time her sore throat has extended down into her chest.  Has never experienced this before, feels different than her normal heartburn. Not associated with activity, not relieved by rest, nonpositional.  No associated nausea, vomiting, fevers, abdominal pain, shortness of breath. Associated with cough. Vitals within normal limits on arrival.    Review of Systems   Review of Systems   Constitutional:  Negative for activity change, appetite change, chills and fever. HENT:  Positive for congestion and sore throat. Negative for postnasal drip, sinus pressure and sinus pain. Respiratory:  Positive for cough. Negative for chest tightness and shortness of breath. Cardiovascular:  Negative for chest pain and leg swelling. Gastrointestinal:  Negative for abdominal pain, constipation and diarrhea. Neurological:  Negative for dizziness and light-headedness. Past Medical/Surgical History     Past Medical History:   Diagnosis Date    Abnormal nuclear cardiac imaging test 09/30/2014    Low risk. No ischemia or prior infarction. EF 63%. No RWMA. Normal EKG on pharm stress test.    DVT (deep venous thrombosis) ([address]) 07/14/2015    Left leg:  No DVT.     H/O

## 2023-11-05 NOTE — ED NOTES
Discharge medications reviewed with the patient and appropriate educational materials and side effects teaching were provided.       Yovana Lopes RN  11/05/23 6802

## 2024-01-09 ENCOUNTER — HOSPITAL ENCOUNTER (OUTPATIENT)
Facility: HOSPITAL | Age: 54
Discharge: HOME | End: 2024-01-12
Payer: MEDICAID

## 2024-01-09 VITALS — BODY MASS INDEX: 32.21 KG/M2 | HEIGHT: 62 IN | WEIGHT: 175.04 LBS

## 2024-01-09 DIAGNOSIS — Z12.31 VISIT FOR SCREENING MAMMOGRAM: ICD-10-CM

## 2024-01-09 PROCEDURE — 77067 SCR MAMMO BI INCL CAD: CPT

## 2024-05-13 ENCOUNTER — HOSPITAL ENCOUNTER (OUTPATIENT)
Facility: HOSPITAL | Age: 54
Discharge: HOME | End: 2024-05-16
Attending: FAMILY MEDICINE
Payer: MEDICAID

## 2024-05-13 DIAGNOSIS — N64.89 BREAST ASYMMETRY: ICD-10-CM

## 2024-05-13 PROCEDURE — 76642 ULTRASOUND BREAST LIMITED: CPT

## 2024-05-13 PROCEDURE — G0279 TOMOSYNTHESIS, MAMMO: HCPCS

## 2024-05-21 ENCOUNTER — HOSPITAL ENCOUNTER (OUTPATIENT)
Facility: HOSPITAL | Age: 54
Discharge: HOME | End: 2024-05-24
Attending: FAMILY MEDICINE
Payer: MEDICAID

## 2024-05-21 DIAGNOSIS — R92.8 ABNORMAL MAMMOGRAM: ICD-10-CM

## 2024-05-21 DIAGNOSIS — N63.10 MASS OF RIGHT BREAST, UNSPECIFIED QUADRANT: ICD-10-CM

## 2024-05-21 PROCEDURE — 77065 DX MAMMO INCL CAD UNI: CPT

## 2024-05-21 PROCEDURE — 19083 BX BREAST 1ST LESION US IMAG: CPT

## 2024-05-21 PROCEDURE — 88305 TISSUE EXAM BY PATHOLOGIST: CPT

## 2024-05-21 PROCEDURE — 2500000003 HC RX 250 WO HCPCS: Performed by: FAMILY MEDICINE

## 2024-05-21 RX ORDER — LIDOCAINE HYDROCHLORIDE 10 MG/ML
10 INJECTION, SOLUTION EPIDURAL; INFILTRATION; INTRACAUDAL; PERINEURAL ONCE
Status: COMPLETED | OUTPATIENT
Start: 2024-05-21 | End: 2024-05-21

## 2024-05-21 RX ORDER — LIDOCAINE HYDROCHLORIDE AND EPINEPHRINE 10; 10 MG/ML; UG/ML
20 INJECTION, SOLUTION INFILTRATION; PERINEURAL ONCE
Status: COMPLETED | OUTPATIENT
Start: 2024-05-21 | End: 2024-05-21

## 2024-05-21 RX ADMIN — LIDOCAINE HYDROCHLORIDE 10 ML: 10 INJECTION, SOLUTION EPIDURAL; INFILTRATION; INTRACAUDAL; PERINEURAL at 09:14

## 2024-05-21 RX ADMIN — LIDOCAINE HYDROCHLORIDE,EPINEPHRINE BITARTRATE 20 ML: 10; .01 INJECTION, SOLUTION INFILTRATION; PERINEURAL at 09:15

## 2025-02-17 ENCOUNTER — TRANSCRIBE ORDERS (OUTPATIENT)
Facility: HOSPITAL | Age: 55
End: 2025-02-17

## 2025-02-17 DIAGNOSIS — N63.10 MASS OF RIGHT BREAST, UNSPECIFIED QUADRANT: Primary | ICD-10-CM

## 2025-02-24 NOTE — REHAB NOTE
[facility] REFERRAL CONTACT NOTE  [id number] [facility]    Re: Aminta Schwartz    IP Consult for IP Rehab Screen received. Will review case and advise as appropriate. Thank you for the consult.     Osmin Plunkett
Health Care Proxy (HCP)

## 2025-03-06 ENCOUNTER — APPOINTMENT (OUTPATIENT)
Facility: HOSPITAL | Age: 55
End: 2025-03-06
Payer: MEDICAID

## 2025-03-06 ENCOUNTER — HOSPITAL ENCOUNTER (EMERGENCY)
Facility: HOSPITAL | Age: 55
Discharge: HOME | End: 2025-03-06
Attending: EMERGENCY MEDICINE
Payer: MEDICAID

## 2025-03-06 VITALS
RESPIRATION RATE: 16 BRPM | HEIGHT: 63 IN | WEIGHT: 180 LBS | BODY MASS INDEX: 31.89 KG/M2 | OXYGEN SATURATION: 99 % | DIASTOLIC BLOOD PRESSURE: 77 MMHG | SYSTOLIC BLOOD PRESSURE: 126 MMHG | HEART RATE: 71 BPM | TEMPERATURE: 98.5 F

## 2025-03-06 DIAGNOSIS — S83.92XA SPRAIN OF LEFT KNEE, UNSPECIFIED LIGAMENT, INITIAL ENCOUNTER: Primary | ICD-10-CM

## 2025-03-06 DIAGNOSIS — M17.12 ARTHRITIS OF LEFT KNEE: ICD-10-CM

## 2025-03-06 DIAGNOSIS — M25.462 EFFUSION OF LEFT KNEE JOINT: ICD-10-CM

## 2025-03-06 PROCEDURE — 99284 EMERGENCY DEPT VISIT MOD MDM: CPT

## 2025-03-06 PROCEDURE — 6360000002 HC RX W HCPCS: Performed by: EMERGENCY MEDICINE

## 2025-03-06 PROCEDURE — 73562 X-RAY EXAM OF KNEE 3: CPT

## 2025-03-06 PROCEDURE — 96372 THER/PROPH/DIAG INJ SC/IM: CPT

## 2025-03-06 RX ORDER — ERGOCALCIFEROL 1.25 MG/1
50000 CAPSULE, LIQUID FILLED ORAL WEEKLY
COMMUNITY
Start: 2025-02-26

## 2025-03-06 RX ORDER — KETOROLAC TROMETHAMINE 15 MG/ML
30 INJECTION, SOLUTION INTRAMUSCULAR; INTRAVENOUS
Status: COMPLETED | OUTPATIENT
Start: 2025-03-06 | End: 2025-03-06

## 2025-03-06 RX ORDER — FERROUS SULFATE 325(65) MG
1 TABLET ORAL DAILY
COMMUNITY

## 2025-03-06 RX ORDER — B-COMPLEX WITH VITAMIN C
1 TABLET ORAL DAILY
COMMUNITY
Start: 2025-02-26

## 2025-03-06 RX ORDER — METHOCARBAMOL 750 MG/1
750 TABLET, FILM COATED ORAL EVERY 8 HOURS PRN
Qty: 20 TABLET | Refills: 0 | Status: SHIPPED | OUTPATIENT
Start: 2025-03-06

## 2025-03-06 RX ADMIN — KETOROLAC TROMETHAMINE 30 MG: 15 INJECTION, SOLUTION INTRAMUSCULAR; INTRAVENOUS at 10:04

## 2025-03-06 ASSESSMENT — LIFESTYLE VARIABLES
HOW MANY STANDARD DRINKS CONTAINING ALCOHOL DO YOU HAVE ON A TYPICAL DAY: PATIENT DECLINED
HOW OFTEN DO YOU HAVE A DRINK CONTAINING ALCOHOL: PATIENT DECLINED

## 2025-03-06 ASSESSMENT — PAIN DESCRIPTION - ORIENTATION: ORIENTATION: LEFT

## 2025-03-06 ASSESSMENT — PAIN DESCRIPTION - LOCATION: LOCATION: KNEE

## 2025-03-06 ASSESSMENT — PAIN - FUNCTIONAL ASSESSMENT: PAIN_FUNCTIONAL_ASSESSMENT: 0-10

## 2025-03-06 ASSESSMENT — PAIN SCALES - GENERAL: PAINLEVEL_OUTOF10: 7

## 2025-03-06 NOTE — ED TRIAGE NOTES
Patient reports left knee pain and swelling x1 week. States she was exercising when she heard a pop.

## 2025-03-07 NOTE — ED PROVIDER NOTES
RIGHT 5/21/2024 [facility] RAD US         CURRENT MEDICATIONS       Discharge Medication List as of 3/6/2025 10:20 AM        CONTINUE these medications which have NOT CHANGED    Details   B Complex Vitamins (VITAMIN B COMPLEX) TABS Take 1 tablet by mouth dailyHistorical Med      diclofenac sodium (VOLTAREN) 1 % GEL USE 2 (TWO) GRAM 4 TIMES A DAY AS NEEDED FOR PAIN, Historical Med      vitamin D (ERGOCALCIFEROL) 1.25 MG (78866 UT) CAPS capsule Take 1 capsule by mouth Once a week at 5 PMHistorical Med      ferrous sulfate (IRON 325) 325 (65 Fe) MG tablet Take 1 tablet by mouth dailyHistorical Med      omeprazole (PRILOSEC) 20 MG delayed release capsule Take 1 capsule by mouth 2 times daily (before meals), Disp-180 capsule, R-1Print      aspirin 81 MG EC tablet Take by mouth dailyHistorical Med      atorvastatin (LIPITOR) 80 MG tablet Take 80 mg by mouth dailyHistorical Med             ALLERGIES     Patient has no known allergies.    FAMILY HISTORY     No family history on file.       SOCIAL HISTORY       Social History     Socioeconomic History    Marital status:    Tobacco Use    Smoking status: Never    Smokeless tobacco: Never   Substance and Sexual Activity    Alcohol use: No    Drug use: No   Social History Narrative         ** Merged History Encounter **       SCREENINGS         Morena Coma Scale  Eye Opening: Spontaneous  Best Verbal Response: Oriented  Best Motor Response: Obeys commands  Morena Coma Scale Score: 15                           PHYSICAL EXAM    (up to 7 for level 4, 8 or more for level 5)     ED Triage Vitals [03/06/25 0906]   BP Systolic BP Percentile Diastolic BP Percentile Temp Temp Source Pulse Respirations SpO2   126/77 -- -- 98.5 °F (36.9 °C) Oral 71 16 99 %      Height Weight - Scale         1.6 m (5' 3\") 81.6 kg (180 lb)             Physical Exam  Vitals and nursing note reviewed.   Constitutional:       General: She is not in acute distress.     Appearance: Normal appearance. She is

## 2025-03-13 ENCOUNTER — OFFICE VISIT (OUTPATIENT)
Age: 55
End: 2025-03-13
Payer: MEDICAID

## 2025-03-13 VITALS — WEIGHT: 189 LBS | BODY MASS INDEX: 33.49 KG/M2 | HEIGHT: 63 IN

## 2025-03-13 DIAGNOSIS — E66.811 CLASS 1 OBESITY DUE TO EXCESS CALORIES WITHOUT SERIOUS COMORBIDITY WITH BODY MASS INDEX (BMI) OF 32.0 TO 32.9 IN ADULT: ICD-10-CM

## 2025-03-13 DIAGNOSIS — E66.09 CLASS 1 OBESITY DUE TO EXCESS CALORIES WITHOUT SERIOUS COMORBIDITY WITH BODY MASS INDEX (BMI) OF 32.0 TO 32.9 IN ADULT: ICD-10-CM

## 2025-03-13 DIAGNOSIS — M17.12 PRIMARY OSTEOARTHRITIS OF LEFT KNEE: Primary | ICD-10-CM

## 2025-03-13 DIAGNOSIS — M23.92 INTERNAL DERANGEMENT OF LEFT KNEE: ICD-10-CM

## 2025-03-13 PROCEDURE — 99204 OFFICE O/P NEW MOD 45 MIN: CPT | Performed by: ORTHOPAEDIC SURGERY

## 2025-03-13 RX ORDER — MELOXICAM 15 MG/1
15 TABLET ORAL DAILY
Qty: 30 TABLET | Refills: 2 | Status: SHIPPED | OUTPATIENT
Start: 2025-03-13 | End: 2025-06-11

## 2025-03-13 RX ORDER — ACETAMINOPHEN 500 MG
1000 TABLET ORAL EVERY 8 HOURS
Qty: 180 TABLET | Refills: 0 | Status: SHIPPED | OUTPATIENT
Start: 2025-03-13 | End: 2025-04-12

## 2025-03-13 NOTE — PROGRESS NOTES
well as conservative pain medication including Tylenol, Mobic and Voltaren gel.  I like to see her back in 6 weeks to check on her progress.  If she is continue to have difficulty we will get in touch on the subject of injection or MRI if needed.           No data to display              Tobacco Use: Low Risk  (5/21/2024)    Patient History     Smoking Tobacco Use: Never     Smokeless Tobacco Use: Never     Passive Exposure: Not on file         Past Medical History:   Diagnosis Date    Abnormal nuclear cardiac imaging test 09/30/2014    Low risk.  No ischemia or prior infarction.  EF 63%.  No RWMA.  Normal EKG on pharm stress test.    DVT (deep venous thrombosis) ([facility]) 07/14/2015    Left leg:  No DVT.    H/O transesophageal echocardiography (DEBI) for monitoring 09/30/2014    EF 55%.  Very sm R-L shunt suggestive of PFO.  No LA appendage thrombus.         No family history on file.    Current Outpatient Medications   Medication Sig Dispense Refill    acetaminophen (TYLENOL) 500 MG tablet Take 2 tablets by mouth in the morning and 2 tablets at noon and 2 tablets in the evening. 180 tablet 0    diclofenac sodium (VOLTAREN) 1 % GEL Apply 4 g topically 4 times daily 350 g 5    meloxicam (MOBIC) 15 MG tablet Take 1 tablet by mouth daily 30 tablet 2    B Complex Vitamins (VITAMIN B COMPLEX) TABS Take 1 tablet by mouth daily      diclofenac sodium (VOLTAREN) 1 % GEL USE 2 (TWO) GRAM 4 TIMES A DAY AS NEEDED FOR PAIN      vitamin D (ERGOCALCIFEROL) 1.25 MG (51906 UT) CAPS capsule Take 1 capsule by mouth Once a week at 5 PM      ferrous sulfate (IRON 325) 325 (65 Fe) MG tablet Take 1 tablet by mouth daily      methocarbamol (ROBAXIN-750) 750 MG tablet Take 1 tablet by mouth every 8 hours as needed (For musculoskeletal pain; muscle spasms) Do not drive or use any heavy type equipment while taking this medication as it can cause drowsiness 20 tablet 0    omeprazole (PRILOSEC) 20 MG delayed release capsule Take 1 capsule by mouth

## 2025-03-26 ENCOUNTER — HOSPITAL ENCOUNTER (OUTPATIENT)
Facility: HOSPITAL | Age: 55
Setting detail: RECURRING SERIES
Discharge: HOME | End: 2025-03-29
Attending: ORTHOPAEDIC SURGERY
Payer: MEDICAID

## 2025-03-26 PROCEDURE — 97162 PT EVAL MOD COMPLEX 30 MIN: CPT

## 2025-03-26 NOTE — PROGRESS NOTES
[facility]  [address] [place] [address] Ph:[phone] Fx: [phone]  Plan of Care / Statement of Necessity for Physical Therapy Services     Patient Name: Karyn Reese : 1970   Medical   Diagnosis: Primary osteoarthritis of left knee [M17.12] Treatment Diagnosis: M25.562  LEFT KNEE PAIN       Onset Date: ~3weeks ago Payor :  Payor: [facility] MEDICAID / Plan: [facility] PLAN CARDINAL CARE / Product Type: *No Product type* /    Referral Source: Lui Oh DO Start of Care (SOC): 3/26/2025   Prior Hospitalization: See medical history Provider #: [id number]   Prior Level of Function: , prolonged standing, exercise   Comorbidities:  Bilateral SIJ pain, reflux, interm right knee pain      Assessment / key information:  Pt is a 54 year old female who presents to PT with antalgic gait and c/o left knee pain since performing some exercises at home and hearing a pop in her knee. It began to swell that day and 3 days later she went to there ER where x-rays were negative. She was referred to ortho MD with suspicion of meniscal dysfunction as she did experience 1 bout of locking. S/S consistent at this time with likely meniscus involvement. Pt is limited in amb and standing tolerance to perform her work duties. Pt would benefit from skilled PT to address increased pain, decreased strength and ROM, as well as impaired gait and balance.    Objective Information/Functional Measures/Assessment  Walking with a cane since injury, but not today  Antalgic Gait with decreased Wbing on LLE  Step-to pattern on stairs   Sit<>stand with decreased Wbing on LLE and RLE extended in front  Bilateral Genu Valgus   TTP: anterior lateral joint line   ROM:   Right -3-117deg  Left 0-75deg   MMT:  Hip flex Left 3/5 pain, Right 4/5  Knee Ext Left 4-/5 pain, Right 4/5  Knee Flex Left 4-/5, Right 4/5  Hip Abd Left 2/5, Right 3-/5  Hip Ext Left

## 2025-03-26 NOTE — PROGRESS NOTES
PHYSICAL / OCCUPATIONAL THERAPY - DAILY TREATMENT NOTE    Patient Name: Karyn Reese    Date: 3/26/2025    : 1970  Insurance: Payor: [facility] MEDICAID / Plan: [facility] COMMUNITY PLAN CARDINAL CARE / Product Type: *No Product type* /      Patient  verified Yes     Visit #   Current / Total 1 16   Time   In / Out 920 955   Pain   In / Out 5 5   Subjective Functional Status/Changes: Left knee pain-Daughter encouraged pt to do exercises. Was doing exercises at home and felt a pop in her knee. 3 hours later it began to swell. 3 days later went to ER 3/6/24 due to pain. 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   Pain on stairs   \"Walk like I walked before, be able to stand to do hair\"  PMHx: SIJ pain, Reflux     TREATMENT AREA =  Primary osteoarthritis of left knee    OBJECTIVE         Therapeutic Procedures:    Tx Min Billable or 1:1 Min (if diff from Tx Min) Procedure, Rationale, Specifics   5  [id number] Self Care/Home Management (timed):  improve patient knowledge and understanding of home injury/symptom/pain management and home safety  to improve patient's ability to progress to PLOF and address remaining functional goals.  (see flow sheet as applicable)     Details if applicable:  educated on using ice and SPC to decrease limp     5  [facility] BC Totals Reminder: bill using total billable min of TIMED therapeutic procedures (example: do not include dry needle or estim unattended, both untimed codes, in totals to left)  8-22 min = 1 unit; 23-37 min = 2 units; 38-52 min = 3 units; 53-67 min = 4 units; 68-82 min = 5 units   Total Total     Charge Capture    [x]  Patient Education billed concurrently with other procedures   [x] Review HEP    [] Progressed/Changed HEP, detail:    [] Other detail:       Objective Information/Functional Measures/Assessment  Walking with a cane since injury, but not

## 2025-04-03 ENCOUNTER — HOSPITAL ENCOUNTER (OUTPATIENT)
Facility: HOSPITAL | Age: 55
End: 2025-04-03
Payer: MEDICAID

## 2025-04-03 ENCOUNTER — HOSPITAL ENCOUNTER (OUTPATIENT)
Facility: HOSPITAL | Age: 55
Discharge: HOME | End: 2025-04-03
Payer: MEDICAID

## 2025-04-03 VITALS — BODY MASS INDEX: 33.49 KG/M2 | HEIGHT: 63 IN | WEIGHT: 189 LBS

## 2025-04-03 DIAGNOSIS — Z87.898 HISTORY OF ABNORMAL MAMMOGRAM: ICD-10-CM

## 2025-04-03 DIAGNOSIS — N63.10 MASS OF RIGHT BREAST, UNSPECIFIED QUADRANT: ICD-10-CM

## 2025-04-03 PROCEDURE — 77063 BREAST TOMOSYNTHESIS BI: CPT

## 2025-04-08 ENCOUNTER — HOSPITAL ENCOUNTER (OUTPATIENT)
Facility: HOSPITAL | Age: 55
Setting detail: RECURRING SERIES
Discharge: HOME | End: 2025-04-11
Attending: ORTHOPAEDIC SURGERY
Payer: MEDICAID

## 2025-04-08 PROCEDURE — 97535 SELF CARE MNGMENT TRAINING: CPT

## 2025-04-08 PROCEDURE — 97530 THERAPEUTIC ACTIVITIES: CPT

## 2025-04-08 PROCEDURE — 97110 THERAPEUTIC EXERCISES: CPT

## 2025-04-08 PROCEDURE — 97112 NEUROMUSCULAR REEDUCATION: CPT

## 2025-04-08 NOTE — PROGRESS NOTES
treatment of this patient, the contents of this document represent the material reviewed with the patient via the .     Future Appointments   Date Time Provider Department Center   4/8/2025  8:40 AM Helene Castillo, PTA [facility]   4/10/2025  8:40 AM Alexys Turk, PT [facility]   4/15/2025  8:40 AM Helene Castillo, PTA [facility]   4/16/2025  8:40 AM Helene Castillo, PTA [facility]   4/22/2025  8:40 AM Helene Castillo, PTA [facility]   4/24/2025  8:40 AM Alexys Turk, PT [facility]   4/29/2025  8:40 AM Helene Castillo, PTA [facility]   5/1/2025  8:40 AM Alexys Turk, PT [facility]

## 2025-04-15 ENCOUNTER — HOSPITAL ENCOUNTER (OUTPATIENT)
Facility: HOSPITAL | Age: 55
Setting detail: RECURRING SERIES
Discharge: HOME | End: 2025-04-18
Attending: ORTHOPAEDIC SURGERY
Payer: MEDICAID

## 2025-04-15 PROCEDURE — 97535 SELF CARE MNGMENT TRAINING: CPT

## 2025-04-15 PROCEDURE — 97112 NEUROMUSCULAR REEDUCATION: CPT

## 2025-04-15 PROCEDURE — 97110 THERAPEUTIC EXERCISES: CPT

## 2025-04-15 PROCEDURE — 97530 THERAPEUTIC ACTIVITIES: CPT

## 2025-04-15 NOTE — PROGRESS NOTES
PHYSICAL / OCCUPATIONAL THERAPY - DAILY TREATMENT NOTE    Patient Name: Karyn Reese    Date: 4/15/2025    : 1970  [facility] / Plan: [facility] COMMUNITY PLAN CARDINAL CARE / Product Type: *No Product type* /      Patient  verified Yes     Visit #   Current / Total 3 16   Time   In / Out 8:38 9:30   Pain   In / Out /10 left knee 3/10 left knee   Subjective Functional Status/Changes: Pt reports she is fine, her left knee is a 5/10 today.      TREATMENT AREA =  Primary osteoarthritis of left knee    OBJECTIVE    Modalities Rationale:     decrease inflammation and decrease pain to improve patient's ability to progress to PLOF and address remaining functional goals.     min [] Estim Unattended, type/location:                                      []  w/ice    []  w/heat    min [] Estim Attended, type/location:                                     []  w/US     []  w/ice    []  w/heat    []  TENS insruct      min []  Mechanical Traction: type/lbs                   []  pro   []  sup   []  int   []  cont    []  before manual    []  after manual    min []  Ultrasound, settings/location:     10 min  unbill [x]  Ice     []  Heat    location/position: Supine with wedge - left knee    min []  Paraffin,  details:     min []  Vasopneumatic Device, press/temp:     min []  Whirlpool / Fluido:    If using vaso (only need to measure limb vaso being performed on)      pre-treatment girth :       post-treatment girth :       measured at (landmark location) :      min []  Other:    Skin assessment post-treatment:   Intact      Therapeutic Procedures:    Tx Min Billable or 1:1 Min (if diff from Tx Min) Procedure, Rationale, Specifics   [id number] Therapeutic Exercise (timed):  increase ROM, strength, coordination, balance, and proprioception to improve patient's ability to progress to PLOF and address remaining functional goals. (see flow sheet as applicable)     Details if applicable:  see FS     [phone] [id number]

## 2025-04-16 ENCOUNTER — TELEPHONE (OUTPATIENT)
Facility: HOSPITAL | Age: 55
End: 2025-04-16

## 2025-04-22 ENCOUNTER — HOSPITAL ENCOUNTER (OUTPATIENT)
Facility: HOSPITAL | Age: 55
Setting detail: RECURRING SERIES
End: 2025-04-22
Attending: ORTHOPAEDIC SURGERY
Payer: MEDICAID

## 2025-04-24 ENCOUNTER — HOSPITAL ENCOUNTER (OUTPATIENT)
Facility: HOSPITAL | Age: 55
Setting detail: RECURRING SERIES
Discharge: HOME | End: 2025-04-27
Attending: ORTHOPAEDIC SURGERY
Payer: MEDICAID

## 2025-04-24 PROCEDURE — 97535 SELF CARE MNGMENT TRAINING: CPT

## 2025-04-24 PROCEDURE — 97530 THERAPEUTIC ACTIVITIES: CPT

## 2025-04-24 PROCEDURE — 97110 THERAPEUTIC EXERCISES: CPT

## 2025-04-24 PROCEDURE — 97112 NEUROMUSCULAR REEDUCATION: CPT

## 2025-04-24 PROCEDURE — 97116 GAIT TRAINING THERAPY: CPT

## 2025-04-24 NOTE — PROGRESS NOTES
PHYSICAL THERAPY - DAILY TREATMENT NOTE     Patient Name: Karyn Reese    Date: 2025    : 1970  Ins[facility] / Plan: [facility] PLAN CARDINAL CARE / Product Type: *No Product type* /      Patient  verified yes     Visit #   Current / Total 4 16   Time   In / Out [id number]   Pain   In / Out 5 3   Subjective Functional Status/Changes: \"I have other appointments so I don't always remember when my therapy appointment is\"     TREATMENT AREA =  Primary osteoarthritis of left knee [M17.12]    Next PN due 25  Auth due 16V 3/26/25-25    OBJECTIVE    Therapeutic Procedures:  Tx Min Procedure, Rationale, Specifics   14 57128 Therapeutic Exercise (timed):  increase ROM, strength, coordination, balance, and proprioception to improve patient's ability to progress to PLOF and address remaining functional goals. (see flow sheet as applicable)     Details if applicable:       12 13769 Therapeutic Activity (timed):  use of dynamic activities replicating functional movements to increase ROM, strength, coordination, balance, and proprioception in order to improve patient's ability to progress to PLOF and address remaining functional goals.  (see flow sheet as applicable)     Details if applicable:    Mini Squats  Split Stance Squat  Step Ups  Bridges   8 83773 Neuromuscular Re-Education (timed):  improve balance, coordination, kinesthetic sense, posture, core stability and proprioception to improve patient's ability to develop conscious control of individual muscles and awareness of position of extremities in order to progress to PLOF and address remaining functional goals. (see flow sheet as applicable)     Details if applicable:     8 31837 Gait Training (timed):    To improve safety and dynamic movement with household/community ambulation.  (see flow sheet as applicable)     Details if applicable:  Heel strike to midfoot rock, midfoot to toe off rock   8 38721 Self Care/Home

## 2025-04-24 NOTE — PROGRESS NOTES
[facility] - [facility] AT [facility]  [address] [place] [address] Ph:[phone] Fx: [phone]  PROGRESS NOTE  Patient Name: Karyn Reese : 1970   Treatment/Medical Diagnosis: Primary osteoarthritis of left knee [M17.12]   Referral Source: Lui Oh DO     Date of Initial Visit: 3/26/25 Attended Visits: 4 Missed Visits: 3     SUMMARY OF TREATMENT  Patient's POC has consisted of therex, therapeutic activities, manual therapy prn, modalities prn, pt. education, and a comprehensive HEP. Treatment strategies used to address functional mobility deficits, ROM deficits, strength deficits, analyze and address soft tissue restrictions, analyze and cue movement patterns, analyze and modify body mechanics/ergonomics, assess and modify postural abnormalities and instruct in home and community integration.     CURRENT STATUS  Patient making slow progress as she has had poor HEP and attendance compliance with 2 no-shows and 1 cancellation. Patient reports that she does perform her HEP daily, and only once a day at most, believing that walking \"is good enough.\" She continues to demonstrate an antalgic gait, and requires mod/max cueing to perform her TE correctly. Patient demonstrates left knee AROM 0-85 degrees, PROM 0-95 degrees.     GOALS/MEASURE OF PROGRESS Goal Met?   Goal: Pt will be compliant with HEP for symptom management at home.  Status at evaluation: Issue NV     2.   Goal: Pt will demo sit<>stand with symmetrical WBIng for decrease stress on opposite LE.   Status at evaluation:Wbing on LLE and RLE extended in front      3.   Goal: Pt will increase left knee AROM to >/= 0-110deg for increased ease with amb.   Status at evaluation: 0-75    NO        NO            NO     Non-Medicare, can change goals, can adjust or add frequency duration, no signature required      New Goals to be achieved in __4__ weeks  1    Goal: Pt will be compliant with HEP

## 2025-04-29 ENCOUNTER — HOSPITAL ENCOUNTER (OUTPATIENT)
Facility: HOSPITAL | Age: 55
Setting detail: RECURRING SERIES
Discharge: HOME | End: 2025-05-02
Attending: ORTHOPAEDIC SURGERY
Payer: MEDICAID

## 2025-04-29 PROCEDURE — 97530 THERAPEUTIC ACTIVITIES: CPT

## 2025-04-29 PROCEDURE — 97535 SELF CARE MNGMENT TRAINING: CPT

## 2025-04-29 PROCEDURE — 97110 THERAPEUTIC EXERCISES: CPT

## 2025-04-29 PROCEDURE — 97112 NEUROMUSCULAR REEDUCATION: CPT

## 2025-04-29 NOTE — PROGRESS NOTES
PHYSICAL THERAPY - DAILY TREATMENT NOTE     Patient Name: Karyn Reese    Date: 2025    : 1970  Insurance: Payor: [facility] MEDICAID / Plan: [facility] PLAN CARDINAL CARE / Product Type: *No Product type* /      Patient  verified yes     Visit #   Current / Total 1 8   Time   In / Out 8:40 9:30   Pain   In / Out 0/10 0/10   Subjective Functional Status/Changes: Pt reports no pain this morning.      TREATMENT AREA =  Primary osteoarthritis of left knee [M17.12]    Next PN due 25  Auth due 16V 3/26/25-25    OBJECTIVE    Therapeutic Procedures:  Tx Min Procedure, Rationale, Specifics   12 60391 Therapeutic Exercise (timed):  increase ROM, strength, coordination, balance, and proprioception to improve patient's ability to progress to PLOF and address remaining functional goals. (see flow sheet as applicable)     Details if applicable:       12 48285 Therapeutic Activity (timed):  use of dynamic activities replicating functional movements to increase ROM, strength, coordination, balance, and proprioception in order to improve patient's ability to progress to PLOF and address remaining functional goals.  (see flow sheet as applicable)     Details if applicable:    Mini Squats  Split Stance Squat  Step Ups  [facility]     8 95643 Neuromuscular Re-Education (timed):  improve balance, coordination, kinesthetic sense, posture, core stability and proprioception to improve patient's ability to develop conscious control of individual muscles and awareness of position of extremities in order to progress to PLOF and address remaining functional goals. (see flow sheet as applicable)     Details if applicable:  core, balance     8 23567 Self Care/Home Management (timed):  improve patient knowledge and understanding of home injury/symptom/pain management, positioning, posture/ergonomics, home safety, activity modification, transfer techniques, and joint protection strategies  to improve patient's ability to

## 2025-05-01 ENCOUNTER — HOSPITAL ENCOUNTER (OUTPATIENT)
Facility: HOSPITAL | Age: 55
Setting detail: RECURRING SERIES
Discharge: HOME | End: 2025-05-04
Attending: ORTHOPAEDIC SURGERY
Payer: MEDICAID

## 2025-05-01 PROCEDURE — 97112 NEUROMUSCULAR REEDUCATION: CPT

## 2025-05-01 PROCEDURE — 97110 THERAPEUTIC EXERCISES: CPT

## 2025-05-01 PROCEDURE — 97530 THERAPEUTIC ACTIVITIES: CPT

## 2025-05-01 PROCEDURE — 97116 GAIT TRAINING THERAPY: CPT

## 2025-05-01 NOTE — PROGRESS NOTES
PHYSICAL THERAPY - DAILY TREATMENT NOTE     Patient Name: Karyn Reese    Date: 2025    : 1970  Insurance: Payor: [facility] MEDICAID / Plan: [facility] PLAN CARDINAL CARE / Product Type: *No Product type* /      Patient  verified yes     Visit #   Current / Total 2 (6) 8 (12)   Time   In / Out 845 929   Pain   In / Out 0 0   Subjective Functional Status/Changes: \"It's not hurting when I'm walking\"     TREATMENT AREA =  Primary osteoarthritis of left knee [M17.12]    Next PN due 25  Auth due 16V 3/26/25-25       OBJECTIVE    Therapeutic Procedures:  Tx Min Billable or 1:1 Min (if diff from Tx Min) Procedure, Rationale, Specifics   8  64531 Therapeutic Exercise (timed):  increase ROM, strength, coordination, balance, and proprioception to improve patient's ability to progress to PLOF and address remaining functional goals. (see flow sheet as applicable)     Details if applicable:       10  97144 Therapeutic Activity (timed):  use of dynamic activities replicating functional movements to increase ROM, strength, coordination, balance, and proprioception in order to improve patient's ability to progress to PLOF and address remaining functional goals.  (see flow sheet as applicable)     Details if applicable:     8  77783 Neuromuscular Re-Education (timed):  improve balance, coordination, kinesthetic sense, posture, core stability and proprioception to improve patient's ability to develop conscious control of individual muscles and awareness of position of extremities in order to progress to PLOF and address remaining functional goals. (see flow sheet as applicable)     Details if applicable:    MSR:  -Midfoot EO/EC  -Bunion EO/EC  -GT EO/EC  -Tandem EO/EC  -SLS (no hands)   8  73137 Gait Training (timed):    100 feet over even surfaces with supervision level of assist. Cuing for posture.  To improve safety and dynamic movement with household/community ambulation.  (see flow sheet as

## 2025-05-06 ENCOUNTER — HOSPITAL ENCOUNTER (OUTPATIENT)
Facility: HOSPITAL | Age: 55
Setting detail: RECURRING SERIES
End: 2025-05-06
Attending: ORTHOPAEDIC SURGERY
Payer: MEDICAID

## 2025-05-06 NOTE — PROGRESS NOTES
[facility] - [facility] AT [facility]  [address] [place] [address] Ph:[phone] Fx: [phone]  DISCHARGE SUMMARY  Patient Name: Karyn Reese : 1970   Treatment/Medical Diagnosis: Primary osteoarthritis of left knee [M17.12]   Referral Source: Lui Oh DO     Date of Initial Visit: 3/26/25 Attended Visits: 7 Missed Visits: 4     SUMMARY OF TREATMENT  The patient attended only 2 sessions after her last PN sent on 25. She was reminded of our attendance policy, but was still non-compliant.     On the last visit she reported no pain while walking but continued to have difficulty in performing transfers, step negotiation, and dynamic balance.     Due to the inability to further her care from non-compliance to our attendance policy and POC, we are discharging the patient from physical therapy at this time.    We appreciate the kind referral and would willingly work with this patient again, should she be able to arrange regular attendance and is appropriate for further treatment. Your patient's health is our primary concern.    If you have any questions/comments please contact us directly at [phone].   Thank you for allowing us to assist in the care of your patient.  Therapist Signature: Alexys \"ASTER\" MARÍA ELENA Turk, Cert. MDT, Cert. DN, Cert. SMT, Dip. Osteopractic Date: 25   Reporting Period:  Certification Period: 25-  N/a Time: 8:13 AM     NOTE TO PHYSICIAN:  PLEASE COMPLETE THE ORDERS BELOW AND FAX TO   [facility] Physical Therapy: [phone]  If you are unable to process this request in 24 hours please contact our office: [phone]    ___ I have read the above report and request that my patient continue as recommended.   ___ I have read the above report and request that my patient continue therapy with the following changes/special instructions:_________________________________________________________   ___ I have

## 2025-05-09 ENCOUNTER — TRANSCRIBE ORDERS (OUTPATIENT)
Facility: HOSPITAL | Age: 55
End: 2025-05-09

## 2025-05-09 DIAGNOSIS — M25.562 LEFT KNEE PAIN, UNSPECIFIED CHRONICITY: Primary | ICD-10-CM

## 2025-05-13 ENCOUNTER — APPOINTMENT (OUTPATIENT)
Facility: HOSPITAL | Age: 55
End: 2025-05-13
Attending: ORTHOPAEDIC SURGERY
Payer: MEDICAID

## 2025-05-20 ENCOUNTER — APPOINTMENT (OUTPATIENT)
Facility: HOSPITAL | Age: 55
End: 2025-05-20
Attending: ORTHOPAEDIC SURGERY
Payer: MEDICAID

## 2025-05-27 ENCOUNTER — APPOINTMENT (OUTPATIENT)
Facility: HOSPITAL | Age: 55
End: 2025-05-27
Attending: ORTHOPAEDIC SURGERY
Payer: MEDICAID

## 2025-07-02 ENCOUNTER — HOSPITAL ENCOUNTER (OUTPATIENT)
Facility: HOSPITAL | Age: 55
Discharge: HOME | End: 2025-07-05
Payer: MEDICAID

## 2025-07-02 DIAGNOSIS — M25.562 LEFT KNEE PAIN, UNSPECIFIED CHRONICITY: ICD-10-CM

## 2025-07-02 PROCEDURE — 73721 MRI JNT OF LWR EXTRE W/O DYE: CPT

## 2025-08-07 ENCOUNTER — OFFICE VISIT (OUTPATIENT)
Age: 55
End: 2025-08-07

## 2025-08-07 VITALS — BODY MASS INDEX: 33.31 KG/M2 | WEIGHT: 188 LBS | HEIGHT: 63 IN

## 2025-08-07 DIAGNOSIS — M17.12 PRIMARY OSTEOARTHRITIS OF LEFT KNEE: Primary | ICD-10-CM

## 2025-08-07 RX ORDER — TRIAMCINOLONE ACETONIDE 40 MG/ML
80 INJECTION, SUSPENSION INTRA-ARTICULAR; INTRAMUSCULAR ONCE
Status: COMPLETED | OUTPATIENT
Start: 2025-08-07 | End: 2025-08-07

## 2025-08-07 RX ADMIN — TRIAMCINOLONE ACETONIDE 80 MG: 40 INJECTION, SUSPENSION INTRA-ARTICULAR; INTRAMUSCULAR at 08:17

## 2025-08-23 ENCOUNTER — APPOINTMENT (OUTPATIENT)
Facility: HOSPITAL | Age: 55
End: 2025-08-23
Payer: MEDICAID

## 2025-08-23 ENCOUNTER — HOSPITAL ENCOUNTER (EMERGENCY)
Facility: HOSPITAL | Age: 55
Discharge: HOME | End: 2025-08-23
Attending: STUDENT IN AN ORGANIZED HEALTH CARE EDUCATION/TRAINING PROGRAM
Payer: MEDICAID

## 2025-08-23 VITALS
BODY MASS INDEX: 33.32 KG/M2 | HEIGHT: 63 IN | DIASTOLIC BLOOD PRESSURE: 70 MMHG | HEART RATE: 70 BPM | SYSTOLIC BLOOD PRESSURE: 101 MMHG | WEIGHT: 188.05 LBS | OXYGEN SATURATION: 100 % | RESPIRATION RATE: 11 BRPM | TEMPERATURE: 98.3 F

## 2025-08-23 DIAGNOSIS — G43.909 MIGRAINE WITHOUT STATUS MIGRAINOSUS, NOT INTRACTABLE, UNSPECIFIED MIGRAINE TYPE: Primary | ICD-10-CM

## 2025-08-23 DIAGNOSIS — R53.1 ACUTE RIGHT-SIDED WEAKNESS: ICD-10-CM

## 2025-08-23 LAB
ALBUMIN SERPL-MCNC: 3.4 G/DL (ref 3.4–5)
ALBUMIN/GLOB SERPL: 1 (ref 0.8–1.7)
ALP SERPL-CCNC: 87 U/L (ref 45–117)
ALT SERPL-CCNC: 18 U/L (ref 10–35)
ANION GAP SERPL CALC-SCNC: 9 MMOL/L (ref 3–18)
AST SERPL-CCNC: 24 U/L (ref 10–38)
BASOPHILS # BLD: 0.01 K/UL (ref 0–0.1)
BASOPHILS NFR BLD: 0.3 % (ref 0–2)
BILIRUB SERPL-MCNC: 0.4 MG/DL (ref 0.2–1)
BUN SERPL-MCNC: 9 MG/DL (ref 6–23)
BUN/CREAT SERPL: 14 (ref 12–20)
CALCIUM SERPL-MCNC: 10.4 MG/DL (ref 8.5–10.1)
CHLORIDE SERPL-SCNC: 108 MMOL/L (ref 98–107)
CO2 SERPL-SCNC: 26 MMOL/L (ref 21–32)
CREAT SERPL-MCNC: 0.62 MG/DL (ref 0.6–1.3)
DIFFERENTIAL METHOD BLD: ABNORMAL
EOSINOPHIL # BLD: 0.07 K/UL (ref 0–0.4)
EOSINOPHIL NFR BLD: 1.9 % (ref 0–5)
ERYTHROCYTE [DISTWIDTH] IN BLOOD BY AUTOMATED COUNT: 12.1 % (ref 11.6–14.5)
GLOBULIN SER CALC-MCNC: 3.3 G/DL (ref 2–4)
GLUCOSE BLD STRIP.AUTO-MCNC: 99 MG/DL (ref 70–110)
GLUCOSE SERPL-MCNC: 82 MG/DL (ref 74–108)
HCT VFR BLD AUTO: 35.8 % (ref 35–45)
HGB BLD-MCNC: 11.5 G/DL (ref 12–16)
IMM GRANULOCYTES # BLD AUTO: 0 K/UL (ref 0–0.04)
IMM GRANULOCYTES NFR BLD AUTO: 0 % (ref 0–0.5)
INR PPP: 1 (ref 0.9–1.2)
LYMPHOCYTES # BLD: 1.58 K/UL (ref 0.9–3.6)
LYMPHOCYTES NFR BLD: 42.8 % (ref 21–52)
MAGNESIUM SERPL-MCNC: 1.9 MG/DL (ref 1.6–2.6)
MCH RBC QN AUTO: 31.9 PG (ref 24–34)
MCHC RBC AUTO-ENTMCNC: 32.1 G/DL (ref 31–37)
MCV RBC AUTO: 99.2 FL (ref 78–100)
MONOCYTES # BLD: 0.4 K/UL (ref 0.05–1.2)
MONOCYTES NFR BLD: 10.8 % (ref 3–10)
NEUTS SEG # BLD: 1.63 K/UL (ref 1.8–8)
NEUTS SEG NFR BLD: 44.2 % (ref 40–73)
NRBC # BLD: 0 K/UL (ref 0–0.01)
NRBC BLD-RTO: 0 PER 100 WBC
PLATELET # BLD AUTO: 166 K/UL (ref 135–420)
PMV BLD AUTO: 12.6 FL (ref 9.2–11.8)
POTASSIUM SERPL-SCNC: 4.2 MMOL/L (ref 3.5–5.5)
PROT SERPL-MCNC: 6.6 G/DL (ref 6.4–8.2)
PROTHROMBIN TIME: 13.8 SEC (ref 12–15.1)
RBC # BLD AUTO: 3.61 M/UL (ref 4.2–5.3)
SODIUM SERPL-SCNC: 142 MMOL/L (ref 136–145)
TROPONIN T SERPL HS-MCNC: 6.8 NG/L (ref 0–14)
WBC # BLD AUTO: 3.7 K/UL (ref 4.6–13.2)

## 2025-08-23 PROCEDURE — 6360000002 HC RX W HCPCS: Performed by: STUDENT IN AN ORGANIZED HEALTH CARE EDUCATION/TRAINING PROGRAM

## 2025-08-23 PROCEDURE — 6360000004 HC RX CONTRAST MEDICATION: Performed by: STUDENT IN AN ORGANIZED HEALTH CARE EDUCATION/TRAINING PROGRAM

## 2025-08-23 PROCEDURE — 99285 EMERGENCY DEPT VISIT HI MDM: CPT

## 2025-08-23 PROCEDURE — 96374 THER/PROPH/DIAG INJ IV PUSH: CPT

## 2025-08-23 PROCEDURE — 84484 ASSAY OF TROPONIN QUANT: CPT

## 2025-08-23 PROCEDURE — 80053 COMPREHEN METABOLIC PANEL: CPT

## 2025-08-23 PROCEDURE — 82962 GLUCOSE BLOOD TEST: CPT

## 2025-08-23 PROCEDURE — 85610 PROTHROMBIN TIME: CPT

## 2025-08-23 PROCEDURE — 96375 TX/PRO/DX INJ NEW DRUG ADDON: CPT

## 2025-08-23 PROCEDURE — 6370000000 HC RX 637 (ALT 250 FOR IP): Performed by: STUDENT IN AN ORGANIZED HEALTH CARE EDUCATION/TRAINING PROGRAM

## 2025-08-23 PROCEDURE — 93005 ELECTROCARDIOGRAM TRACING: CPT | Performed by: STUDENT IN AN ORGANIZED HEALTH CARE EDUCATION/TRAINING PROGRAM

## 2025-08-23 PROCEDURE — 85025 COMPLETE CBC W/AUTO DIFF WBC: CPT

## 2025-08-23 PROCEDURE — 70498 CT ANGIOGRAPHY NECK: CPT

## 2025-08-23 PROCEDURE — 70450 CT HEAD/BRAIN W/O DYE: CPT

## 2025-08-23 PROCEDURE — 83735 ASSAY OF MAGNESIUM: CPT

## 2025-08-23 RX ORDER — DIPHENHYDRAMINE HYDROCHLORIDE 50 MG/ML
25 INJECTION, SOLUTION INTRAMUSCULAR; INTRAVENOUS
Status: COMPLETED | OUTPATIENT
Start: 2025-08-23 | End: 2025-08-23

## 2025-08-23 RX ORDER — ACETAMINOPHEN 500 MG
1000 TABLET ORAL
Status: COMPLETED | OUTPATIENT
Start: 2025-08-23 | End: 2025-08-23

## 2025-08-23 RX ORDER — METOCLOPRAMIDE HYDROCHLORIDE 5 MG/ML
10 INJECTION INTRAMUSCULAR; INTRAVENOUS
Status: COMPLETED | OUTPATIENT
Start: 2025-08-23 | End: 2025-08-23

## 2025-08-23 RX ORDER — IOPAMIDOL 755 MG/ML
100 INJECTION, SOLUTION INTRAVASCULAR
Status: COMPLETED | OUTPATIENT
Start: 2025-08-23 | End: 2025-08-23

## 2025-08-23 RX ADMIN — IOPAMIDOL 100 ML: 755 INJECTION, SOLUTION INTRAVENOUS at 20:50

## 2025-08-23 RX ADMIN — ACETAMINOPHEN 1000 MG: 500 TABLET ORAL at 21:45

## 2025-08-23 RX ADMIN — DIPHENHYDRAMINE HYDROCHLORIDE 25 MG: 50 INJECTION INTRAMUSCULAR; INTRAVENOUS at 21:45

## 2025-08-23 RX ADMIN — METOCLOPRAMIDE 10 MG: 5 INJECTION, SOLUTION INTRAMUSCULAR; INTRAVENOUS at 21:45

## 2025-08-23 ASSESSMENT — PAIN - FUNCTIONAL ASSESSMENT: PAIN_FUNCTIONAL_ASSESSMENT: 0-10

## 2025-08-24 LAB
EKG ATRIAL RATE: 57 BPM
EKG DIAGNOSIS: NORMAL
EKG P AXIS: 63 DEGREES
EKG P-R INTERVAL: 140 MS
EKG Q-T INTERVAL: 392 MS
EKG QRS DURATION: 70 MS
EKG QTC CALCULATION (BAZETT): 381 MS
EKG R AXIS: 48 DEGREES
EKG T AXIS: -64 DEGREES
EKG VENTRICULAR RATE: 57 BPM

## 2025-08-24 PROCEDURE — 93010 ELECTROCARDIOGRAM REPORT: CPT | Performed by: INTERNAL MEDICINE
